# Patient Record
Sex: FEMALE | Race: BLACK OR AFRICAN AMERICAN | NOT HISPANIC OR LATINO | Employment: FULL TIME | URBAN - METROPOLITAN AREA
[De-identification: names, ages, dates, MRNs, and addresses within clinical notes are randomized per-mention and may not be internally consistent; named-entity substitution may affect disease eponyms.]

---

## 2017-09-28 ENCOUNTER — LAB (OUTPATIENT)
Dept: LAB | Facility: HOSPITAL | Age: 22
End: 2017-09-28

## 2017-09-28 ENCOUNTER — TELEPHONE (OUTPATIENT)
Dept: OBSTETRICS AND GYNECOLOGY | Facility: CLINIC | Age: 22
End: 2017-09-28

## 2017-09-28 DIAGNOSIS — N91.2 AMENORRHEA: ICD-10-CM

## 2017-09-28 DIAGNOSIS — N91.2 AMENORRHEA: Primary | ICD-10-CM

## 2017-09-28 LAB — HCG INTACT+B SERPL-ACNC: NORMAL MIU/ML

## 2017-09-28 PROCEDURE — 36415 COLL VENOUS BLD VENIPUNCTURE: CPT

## 2017-09-28 PROCEDURE — 84702 CHORIONIC GONADOTROPIN TEST: CPT | Performed by: OBSTETRICS & GYNECOLOGY

## 2017-09-28 NOTE — TELEPHONE ENCOUNTER
Dr. Burkett patient   [phone] LMP 8/13/2017 she had a positive urine pregnancy test on 9/3/2017. Patient denies bleeding. Advised patient to go to the lab in the [address] [place] [address] 3rd floor [facility] to have blood work (HCG). Patient verbalized understanding.

## 2021-09-21 ENCOUNTER — LAB (OUTPATIENT)
Dept: LAB | Facility: HOSPITAL | Age: 26
End: 2021-09-21

## 2021-09-21 ENCOUNTER — OFFICE VISIT (OUTPATIENT)
Dept: INTERNAL MEDICINE | Facility: CLINIC | Age: 26
End: 2021-09-21

## 2021-09-21 VITALS
OXYGEN SATURATION: 99 % | SYSTOLIC BLOOD PRESSURE: 128 MMHG | BODY MASS INDEX: 25.58 KG/M2 | HEIGHT: 67 IN | RESPIRATION RATE: 12 BRPM | HEART RATE: 82 BPM | DIASTOLIC BLOOD PRESSURE: 86 MMHG | WEIGHT: 163 LBS

## 2021-09-21 DIAGNOSIS — L50.9 URTICARIA OF UNKNOWN ORIGIN: Primary | ICD-10-CM

## 2021-09-21 DIAGNOSIS — L50.9 URTICARIA OF UNKNOWN ORIGIN: ICD-10-CM

## 2021-09-21 DIAGNOSIS — B37.31 VAGINAL CANDIDA: ICD-10-CM

## 2021-09-21 DIAGNOSIS — Z76.89 ENCOUNTER TO ESTABLISH CARE: ICD-10-CM

## 2021-09-21 LAB
BASOPHILS # BLD AUTO: 0.04 10*3/MM3 (ref 0–0.2)
BASOPHILS NFR BLD AUTO: 0.5 % (ref 0–1.5)
CHOLEST SERPL-MCNC: 140 MG/DL (ref 0–200)
DEPRECATED RDW RBC AUTO: 44.8 FL (ref 37–54)
EOSINOPHIL # BLD AUTO: 0.09 10*3/MM3 (ref 0–0.4)
EOSINOPHIL NFR BLD AUTO: 1.1 % (ref 0.3–6.2)
ERYTHROCYTE [DISTWIDTH] IN BLOOD BY AUTOMATED COUNT: 13.3 % (ref 12.3–15.4)
HCT VFR BLD AUTO: 42.3 % (ref 34–46.6)
HCV AB SER DONR QL: NORMAL
HDLC SERPL-MCNC: 50 MG/DL (ref 40–60)
HGB BLD-MCNC: 13.6 G/DL (ref 12–15.9)
IGA1 MFR SER: 153 MG/DL (ref 70–400)
IGG1 SER-MCNC: 1383 MG/DL (ref 700–1600)
IGM SERPL-MCNC: 90 MG/DL (ref 40–230)
IMM GRANULOCYTES # BLD AUTO: 0.02 10*3/MM3 (ref 0–0.05)
IMM GRANULOCYTES NFR BLD AUTO: 0.3 % (ref 0–0.5)
LDLC SERPL CALC-MCNC: 80 MG/DL (ref 0–100)
LDLC/HDLC SERPL: 1.64 {RATIO}
LYMPHOCYTES # BLD AUTO: 2.13 10*3/MM3 (ref 0.7–3.1)
LYMPHOCYTES NFR BLD AUTO: 26.7 % (ref 19.6–45.3)
MCH RBC QN AUTO: 29.5 PG (ref 26.6–33)
MCHC RBC AUTO-ENTMCNC: 32.2 G/DL (ref 31.5–35.7)
MCV RBC AUTO: 91.8 FL (ref 79–97)
MONOCYTES # BLD AUTO: 0.46 10*3/MM3 (ref 0.1–0.9)
MONOCYTES NFR BLD AUTO: 5.8 % (ref 5–12)
NEUTROPHILS NFR BLD AUTO: 5.25 10*3/MM3 (ref 1.7–7)
NEUTROPHILS NFR BLD AUTO: 65.6 % (ref 42.7–76)
NRBC BLD AUTO-RTO: 0.3 /100 WBC (ref 0–0.2)
PLATELET # BLD AUTO: 265 10*3/MM3 (ref 140–450)
PMV BLD AUTO: 12.9 FL (ref 6–12)
RBC # BLD AUTO: 4.61 10*6/MM3 (ref 3.77–5.28)
TRIGL SERPL-MCNC: 41 MG/DL (ref 0–150)
TSH SERPL DL<=0.05 MIU/L-ACNC: 0.68 UIU/ML (ref 0.27–4.2)
VLDLC SERPL-MCNC: 10 MG/DL (ref 5–40)
WBC # BLD AUTO: 7.99 10*3/MM3 (ref 3.4–10.8)

## 2021-09-21 PROCEDURE — 86003 ALLG SPEC IGE CRUDE XTRC EA: CPT

## 2021-09-21 PROCEDURE — 86803 HEPATITIS C AB TEST: CPT

## 2021-09-21 PROCEDURE — 80050 GENERAL HEALTH PANEL: CPT

## 2021-09-21 PROCEDURE — 80061 LIPID PANEL: CPT

## 2021-09-21 PROCEDURE — 99203 OFFICE O/P NEW LOW 30 MIN: CPT | Performed by: NURSE PRACTITIONER

## 2021-09-21 PROCEDURE — 82784 ASSAY IGA/IGD/IGG/IGM EACH: CPT

## 2021-09-21 RX ORDER — METRONIDAZOLE 500 MG/1
TABLET ORAL
COMMUNITY
Start: 2021-09-13 | End: 2021-09-21

## 2021-09-21 RX ORDER — AZITHROMYCIN 500 MG/1
TABLET, FILM COATED ORAL
COMMUNITY
Start: 2021-09-13 | End: 2021-09-21

## 2021-09-21 RX ORDER — FLUCONAZOLE 150 MG/1
TABLET ORAL
Qty: 2 TABLET | Refills: 2 | Status: SHIPPED | OUTPATIENT
Start: 2021-09-21 | End: 2021-12-09

## 2021-09-22 LAB
ALBUMIN SERPL-MCNC: 4.8 G/DL (ref 3.5–5.2)
ALBUMIN/GLOB SERPL: 1.8 G/DL
ALP SERPL-CCNC: 83 U/L (ref 39–117)
ALT SERPL W P-5'-P-CCNC: 13 U/L (ref 1–33)
ANION GAP SERPL CALCULATED.3IONS-SCNC: 10.3 MMOL/L (ref 5–15)
AST SERPL-CCNC: 22 U/L (ref 1–32)
BILIRUB SERPL-MCNC: 0.3 MG/DL (ref 0–1.2)
BUN SERPL-MCNC: 10 MG/DL (ref 6–20)
BUN/CREAT SERPL: 9.2 (ref 7–25)
CALCIUM SPEC-SCNC: 9.3 MG/DL (ref 8.6–10.5)
CHLORIDE SERPL-SCNC: 105 MMOL/L (ref 98–107)
CO2 SERPL-SCNC: 22.7 MMOL/L (ref 22–29)
CREAT SERPL-MCNC: 1.09 MG/DL (ref 0.57–1)
GFR SERPL CREATININE-BSD FRML MDRD: 72 ML/MIN/1.73
GLOBULIN UR ELPH-MCNC: 2.7 GM/DL
GLUCOSE SERPL-MCNC: 76 MG/DL (ref 65–99)
POTASSIUM SERPL-SCNC: 3.8 MMOL/L (ref 3.5–5.2)
PROT SERPL-MCNC: 7.5 G/DL (ref 6–8.5)
SODIUM SERPL-SCNC: 138 MMOL/L (ref 136–145)

## 2021-09-23 ENCOUNTER — TELEPHONE (OUTPATIENT)
Dept: INTERNAL MEDICINE | Facility: CLINIC | Age: 26
End: 2021-09-23

## 2021-09-24 LAB
A ALTERNATA IGE QN: <0.1 KU/L
A FUMIGATUS IGE QN: <0.1 KU/L
AMER ROACH IGE QN: <0.1 KU/L
BAHIA GRASS IGE QN: <0.1 KU/L
BAYBERRY POLN IGE QN: <0.1 KU/L
BERMUDA GRASS IGE QN: <0.1 KU/L
BOXELDER IGE QN: <0.1 KU/L
C HERBARUM IGE QN: <0.1 KU/L
CAT DANDER IGE QN: <0.1 KU/L
COMMON RAGWEED IGE QN: <0.1 KU/L
CONV CLASS DESCRIPTION: NORMAL
D FARINAE IGE QN: <0.1 KU/L
D PTERONYSS IGE QN: <0.1 KU/L
DOG DANDER IGE QN: <0.1 KU/L
DOG FENNEL IGE QN: <0.1 KU/L
ENGL PLANTAIN IGE QN: <0.1 KU/L
GOOSEFOOT IGE QN: <0.1 KU/L
GUM-TREE IGE QN: <0.1 KU/L
ITALIAN CYPRESS IGE QN: <0.1 KU/L
JOHNSON GRASS IGE QN: <0.1 KU/L
M RACEMOSUS IGE QN: <0.1 KU/L
P NOTATUM IGE QN: <0.1 KU/L
PEPPER TREE IGE QN: <0.1 KU/L
PER RYE GRASS IGE QN: <0.1 KU/L
PRIVET IGE QN: <0.1 KU/L
QUEEN PALM IGE QN: <0.1 KU/L
S BOTRYOSUM IGE QN: <0.1 KU/L
SHEEP SORREL IGE QN: <0.1 KU/L
VIRG LIVE OAK IGE QN: <0.1 KU/L
WHITE ELM IGE QN: <0.1 KU/L

## 2021-09-25 LAB
CLAM IGE QN: <0.1 KU/L
CODFISH IGE QN: <0.1 KU/L
CONV CLASS DESCRIPTION: NORMAL
CORN IGE QN: <0.1 KU/L
COW MILK IGE QN: <0.1 KU/L
EGG WHITE IGE QN: <0.1 KU/L
PEANUT IGE QN: <0.1 KU/L
SCALLOP IGE QN: <0.1 KU/L
SESAME SEED IGE QN: <0.1 KU/L
SHRIMP IGE QN: <0.1 KU/L
SOYBEAN IGE QN: <0.1 KU/L
WALNUT IGE QN: <0.1 KU/L
WHEAT IGE QN: <0.1 KU/L

## 2021-12-09 ENCOUNTER — OFFICE VISIT (OUTPATIENT)
Dept: INTERNAL MEDICINE | Facility: CLINIC | Age: 26
End: 2021-12-09

## 2021-12-09 ENCOUNTER — LAB (OUTPATIENT)
Dept: LAB | Facility: HOSPITAL | Age: 26
End: 2021-12-09

## 2021-12-09 VITALS
WEIGHT: 168.8 LBS | OXYGEN SATURATION: 100 % | BODY MASS INDEX: 26.44 KG/M2 | HEART RATE: 92 BPM | TEMPERATURE: 97.3 F | DIASTOLIC BLOOD PRESSURE: 68 MMHG | SYSTOLIC BLOOD PRESSURE: 122 MMHG

## 2021-12-09 DIAGNOSIS — N28.9 KIDNEY DYSFUNCTION: ICD-10-CM

## 2021-12-09 DIAGNOSIS — Z00.00 HEALTHCARE MAINTENANCE: Primary | ICD-10-CM

## 2021-12-09 DIAGNOSIS — F41.9 ANXIETY: ICD-10-CM

## 2021-12-09 DIAGNOSIS — Z30.011 ENCOUNTER FOR INITIAL PRESCRIPTION OF CONTRACEPTIVE PILLS: ICD-10-CM

## 2021-12-09 DIAGNOSIS — Z00.00 HEALTHCARE MAINTENANCE: ICD-10-CM

## 2021-12-09 LAB
ANION GAP SERPL CALCULATED.3IONS-SCNC: 11.2 MMOL/L (ref 5–15)
B-HCG UR QL: NEGATIVE
BUN SERPL-MCNC: 11 MG/DL (ref 6–20)
BUN/CREAT SERPL: 10 (ref 7–25)
CALCIUM SPEC-SCNC: 9.4 MG/DL (ref 8.6–10.5)
CHLORIDE SERPL-SCNC: 106 MMOL/L (ref 98–107)
CO2 SERPL-SCNC: 23.8 MMOL/L (ref 22–29)
CREAT SERPL-MCNC: 1.1 MG/DL (ref 0.57–1)
EXPIRATION DATE: NORMAL
GFR SERPL CREATININE-BSD FRML MDRD: 72 ML/MIN/1.73
GLUCOSE SERPL-MCNC: 91 MG/DL (ref 65–99)
INTERNAL NEGATIVE CONTROL: NORMAL
INTERNAL POSITIVE CONTROL: NORMAL
Lab: NORMAL
POTASSIUM SERPL-SCNC: 4.5 MMOL/L (ref 3.5–5.2)
SODIUM SERPL-SCNC: 141 MMOL/L (ref 136–145)

## 2021-12-09 PROCEDURE — 86480 TB TEST CELL IMMUN MEASURE: CPT

## 2021-12-09 PROCEDURE — 2014F MENTAL STATUS ASSESS: CPT | Performed by: STUDENT IN AN ORGANIZED HEALTH CARE EDUCATION/TRAINING PROGRAM

## 2021-12-09 PROCEDURE — 36415 COLL VENOUS BLD VENIPUNCTURE: CPT

## 2021-12-09 PROCEDURE — 3008F BODY MASS INDEX DOCD: CPT | Performed by: STUDENT IN AN ORGANIZED HEALTH CARE EDUCATION/TRAINING PROGRAM

## 2021-12-09 PROCEDURE — 81025 URINE PREGNANCY TEST: CPT | Performed by: STUDENT IN AN ORGANIZED HEALTH CARE EDUCATION/TRAINING PROGRAM

## 2021-12-09 PROCEDURE — 80048 BASIC METABOLIC PNL TOTAL CA: CPT

## 2021-12-09 PROCEDURE — 99395 PREV VISIT EST AGE 18-39: CPT | Performed by: STUDENT IN AN ORGANIZED HEALTH CARE EDUCATION/TRAINING PROGRAM

## 2021-12-09 RX ORDER — NORETHINDRONE ACETATE AND ETHINYL ESTRADIOL, ETHINYL ESTRADIOL AND FERROUS FUMARATE 1MG-10(24)
1 KIT ORAL DAILY
Qty: 28 TABLET | Refills: 11 | Status: SHIPPED | OUTPATIENT
Start: 2021-12-09

## 2021-12-09 RX ORDER — ESCITALOPRAM OXALATE 10 MG/1
10 TABLET ORAL DAILY
Qty: 30 TABLET | Refills: 0 | Status: SHIPPED | OUTPATIENT
Start: 2021-12-09

## 2021-12-09 NOTE — PROGRESS NOTES
Internal Medicine Physical Note      Date: 2021     Patient Name: Felecia Estrella  : 1995   MRN: [id number]     Chief Complaint:    Chief Complaint   Patient presents with   • Establish Care   • Anxiety       History of Present Illness: Felecia Estrella is a 26 y.o. female who is here today for her annual health maintenance exam and physical. She notes increased anxiety over the past few months.  She has chronic anxiety but has never required treatment before.  Her grandmother recently passed away which has worsened symptoms.  She feels like she cannot turn her brain off at night and that she worries about everything.    General Health: She describes her health as good.  She has been doing well over the last year with no falls, hospitalizations, or ER visits. She has had no surgeries or changes in medical history over the last year.  There have also been no changes in family history.  She is up-to-date with dental exam but not vision exam. She denies changes in hearing but does note change in vision. She describes her mood as anxious. She is not up-to-date on immunizations. Her last period was last week. She previously used Depo shots for birth control but would like to start OCPs.  No history of migraine with aura or DVT.    Lifestyle: She eats a diverse and healthy diet and exercises. She does not use tobacco products, alcohol, or illicit drugs.  She reports wearing seat belts and avoidance of texting while driving. She wears sunscreen while outdoors on most occasions.     Subjective     Past Medical History:   Past Medical History:   Diagnosis Date   • Anxiety    • Kidney infection        Past Surgical History: History reviewed. No pertinent surgical history.    Family History:   Family History   Problem Relation Age of Onset   • Hypertension Mother    • Stroke Mother    • Diabetes Maternal Grandmother        Social History:   Social History     Socioeconomic History   • Marital status: Single    Tobacco Use   • Smoking status: Current Every Day Smoker     Packs/day: 0.25     Years: 6.00     Pack years: 1.50   • Smokeless tobacco: Never Used   Substance and Sexual Activity   • Alcohol use: Yes     Comment: OCcasionaly   • Drug use: Never   • Sexual activity: Yes       Allergies:   No Known Allergies      Objective     Physical Exam:  Vital Signs:   Vitals:    12/09/21 1032   BP: 122/68   Pulse: 92   Temp: 97.3 °F (36.3 °C)   SpO2: 100%   Weight: 76.6 kg (168 lb 12.8 oz)     Body mass index is 26.44 kg/m².     Physical Exam  Vitals and nursing note reviewed.   Constitutional:       Appearance: Normal appearance.   Cardiovascular:      Rate and Rhythm: Normal rate and regular rhythm.      Heart sounds: Normal heart sounds.   Pulmonary:      Effort: Pulmonary effort is normal.      Breath sounds: Normal breath sounds. No wheezing, rhonchi or rales.   Abdominal:      General: Abdomen is flat. Bowel sounds are normal.      Palpations: Abdomen is soft.   Skin:     General: Skin is warm and dry.   Neurological:      Mental Status: She is alert.   Psychiatric:         Mood and Affect: Mood normal.         Behavior: Behavior normal.       Assessment / Plan      Assessment/Plan:   Diagnoses and all orders for this visit:    1. Healthcare maintenance (Primary)  Pap smear to be performed at next appointment in 6 weeks.  Advice and education was given regarding routine dental evaluations, routine eye exams, reproductive health, cardiovascular risk reduction, sunscreen use, self skin examination (annual dermatology evaluations) and seat belt use (general overall safety).  Further recommendations after lab evaluation.  Annual wellness evaluations recommended.  TB test ordered for screening for new job.     2. Anxiety  Chronic but has never required treatment in the past.  Acutely worsened due to the passing of her grandmother who help to raise her.  Will initiate treatment with escitalopram (Lexapro) 10 MG tablet; Take 1  tablet by mouth Daily.  She will follow-up in 6 weeks to titrate dose if necessary.    3. Kidney dysfunction  Creatinine noted to be 1.09 on last check.  Repeat BMP today    4. Encounter for initial prescription of contraceptive pills  Norethin-Eth Estrad-Fe Biphas (Lo Loestrin Fe) 1 MG-10 MCG / 10 MCG tablet; Take 1 tablet by mouth Daily.  Dispense: 28 tablet; Refill: 11  Pregnancy, Urine - Urine, Clean Catch; Future    Follow Up:   Return in about 6 weeks (around 1/20/2022) for Recheck 30 minute visit with pap smear .    Time:   I spent approximately 30 minutes providing clinical care for this patient; including review of patient's chart and provider documentation, face to face time spent with patient in examination room (obtaining history, performing physical exam, discussing diagnosis and management options), placing orders, and completing patient documentation.     Clara Cooper MD  [facility]

## 2021-12-12 LAB
GAMMA INTERFERON BACKGROUND BLD IA-ACNC: 0.06 IU/ML
M TB IFN-G BLD-IMP: NEGATIVE
M TB IFN-G CD4+ BCKGRND COR BLD-ACNC: 0.07 IU/ML
M TB IFN-G CD4+CD8+ BCKGRND COR BLD-ACNC: 0.11 IU/ML
MITOGEN IGNF BLD-ACNC: >10 IU/ML
SERVICE CMNT-IMP: NORMAL

## 2025-01-30 ENCOUNTER — TRANSCRIBE ORDERS (OUTPATIENT)
Dept: LAB | Facility: HOSPITAL | Age: 30
End: 2025-01-30
Payer: COMMERCIAL

## 2025-01-30 ENCOUNTER — LAB (OUTPATIENT)
Dept: LAB | Facility: HOSPITAL | Age: 30
End: 2025-01-30
Payer: COMMERCIAL

## 2025-01-30 DIAGNOSIS — I10 ESSENTIAL HYPERTENSION, MALIGNANT: ICD-10-CM

## 2025-01-30 DIAGNOSIS — Z34.81 PRENATAL CARE, SUBSEQUENT PREGNANCY, FIRST TRIMESTER: Primary | ICD-10-CM

## 2025-01-30 DIAGNOSIS — Z34.81 PRENATAL CARE, SUBSEQUENT PREGNANCY, FIRST TRIMESTER: ICD-10-CM

## 2025-01-30 LAB
ABO GROUP BLD: NORMAL
BLD GP AB SCN SERPL QL: NEGATIVE
HBV SURFACE AG SERPL QL IA: NORMAL
RH BLD: POSITIVE

## 2025-01-30 PROCEDURE — 83615 LACTATE (LD) (LDH) ENZYME: CPT

## 2025-01-30 PROCEDURE — 86901 BLOOD TYPING SEROLOGIC RH(D): CPT

## 2025-01-30 PROCEDURE — 86762 RUBELLA ANTIBODY: CPT

## 2025-01-30 PROCEDURE — G0432 EIA HIV-1/HIV-2 SCREEN: HCPCS

## 2025-01-30 PROCEDURE — 86787 VARICELLA-ZOSTER ANTIBODY: CPT

## 2025-01-30 PROCEDURE — 85027 COMPLETE CBC AUTOMATED: CPT

## 2025-01-30 PROCEDURE — 86850 RBC ANTIBODY SCREEN: CPT

## 2025-01-30 PROCEDURE — 86900 BLOOD TYPING SEROLOGIC ABO: CPT

## 2025-01-30 PROCEDURE — 86803 HEPATITIS C AB TEST: CPT

## 2025-01-30 PROCEDURE — 80053 COMPREHEN METABOLIC PANEL: CPT

## 2025-01-30 PROCEDURE — 86780 TREPONEMA PALLIDUM: CPT

## 2025-01-30 PROCEDURE — 87340 HEPATITIS B SURFACE AG IA: CPT

## 2025-01-30 PROCEDURE — 36415 COLL VENOUS BLD VENIPUNCTURE: CPT

## 2025-01-30 PROCEDURE — 84550 ASSAY OF BLOOD/URIC ACID: CPT

## 2025-01-31 LAB
ALBUMIN SERPL-MCNC: 4 G/DL (ref 3.5–5.2)
ALBUMIN/GLOB SERPL: 1.2 G/DL
ALP SERPL-CCNC: 86 U/L (ref 39–117)
ALT SERPL W P-5'-P-CCNC: 10 U/L (ref 1–33)
ANION GAP SERPL CALCULATED.3IONS-SCNC: 12.7 MMOL/L (ref 5–15)
AST SERPL-CCNC: 18 U/L (ref 1–32)
BILIRUB SERPL-MCNC: <0.2 MG/DL (ref 0–1.2)
BUN SERPL-MCNC: 11 MG/DL (ref 6–20)
BUN/CREAT SERPL: 11 (ref 7–25)
CALCIUM SPEC-SCNC: 9.6 MG/DL (ref 8.6–10.5)
CHLORIDE SERPL-SCNC: 102 MMOL/L (ref 98–107)
CO2 SERPL-SCNC: 21.3 MMOL/L (ref 22–29)
CREAT SERPL-MCNC: 1 MG/DL (ref 0.57–1)
DEPRECATED RDW RBC AUTO: 41.6 FL (ref 37–54)
EGFRCR SERPLBLD CKD-EPI 2021: 77.4 ML/MIN/1.73
ERYTHROCYTE [DISTWIDTH] IN BLOOD BY AUTOMATED COUNT: 12.2 % (ref 12.3–15.4)
GLOBULIN UR ELPH-MCNC: 3.4 GM/DL
GLUCOSE SERPL-MCNC: 80 MG/DL (ref 65–99)
HCT VFR BLD AUTO: 37.7 % (ref 34–46.6)
HCV AB SER QL: NORMAL
HGB BLD-MCNC: 12.6 G/DL (ref 12–15.9)
HIV 1+2 AB+HIV1 P24 AG SERPL QL IA: NORMAL
LDH SERPL-CCNC: 171 U/L (ref 135–214)
MCH RBC QN AUTO: 31.1 PG (ref 26.6–33)
MCHC RBC AUTO-ENTMCNC: 33.4 G/DL (ref 31.5–35.7)
MCV RBC AUTO: 93.1 FL (ref 79–97)
PLATELET # BLD AUTO: 307 10*3/MM3 (ref 140–450)
PMV BLD AUTO: 11.6 FL (ref 6–12)
POTASSIUM SERPL-SCNC: 3.8 MMOL/L (ref 3.5–5.2)
PROT SERPL-MCNC: 7.4 G/DL (ref 6–8.5)
RBC # BLD AUTO: 4.05 10*6/MM3 (ref 3.77–5.28)
SODIUM SERPL-SCNC: 136 MMOL/L (ref 136–145)
TREPONEMA PALLIDUM IGG+IGM AB [PRESENCE] IN SERUM OR PLASMA BY IMMUNOASSAY: NORMAL
URATE SERPL-MCNC: 3.3 MG/DL (ref 2.4–5.7)
WBC NRBC COR # BLD AUTO: 11.13 10*3/MM3 (ref 3.4–10.8)

## 2025-02-01 LAB
RUBV IGG SERPL IA-ACNC: 6.06 INDEX
VZV IGG SER QL IA: REACTIVE

## 2025-02-17 ENCOUNTER — LAB (OUTPATIENT)
Dept: LAB | Facility: HOSPITAL | Age: 30
End: 2025-02-17
Payer: COMMERCIAL

## 2025-02-17 ENCOUNTER — TRANSCRIBE ORDERS (OUTPATIENT)
Dept: LAB | Facility: HOSPITAL | Age: 30
End: 2025-02-17
Payer: COMMERCIAL

## 2025-02-17 DIAGNOSIS — I10 ESSENTIAL HYPERTENSION, MALIGNANT: Primary | ICD-10-CM

## 2025-02-17 PROCEDURE — 82570 ASSAY OF URINE CREATININE: CPT

## 2025-02-17 PROCEDURE — 84156 ASSAY OF PROTEIN URINE: CPT

## 2025-02-19 LAB
CREAT 24H UR-MRATE: 1808 MG/24 HR (ref 800–1800)
CREAT UR-MCNC: 164.4 MG/DL
PROT 24H UR-MRATE: 505 MG/24 HR (ref 30–150)
PROT UR-MCNC: 45.9 MG/DL
PROT/CREAT UR: 279 MG/G CREAT (ref 0–200)

## 2025-04-11 ENCOUNTER — HOSPITAL ENCOUNTER (INPATIENT)
Facility: HOSPITAL | Age: 30
LOS: 1 days | Discharge: HOME | End: 2025-04-12
Attending: OBSTETRICS & GYNECOLOGY | Admitting: OBSTETRICS & GYNECOLOGY
Payer: COMMERCIAL

## 2025-04-11 PROCEDURE — G0463 HOSPITAL OUTPT CLINIC VISIT: HCPCS

## 2025-04-11 RX ORDER — LABETALOL 100 MG/1
100 TABLET, FILM COATED ORAL 2 TIMES DAILY
COMMUNITY

## 2025-04-12 ENCOUNTER — APPOINTMENT (OUTPATIENT)
Dept: CT IMAGING | Facility: HOSPITAL | Age: 30
End: 2025-04-12
Payer: COMMERCIAL

## 2025-04-12 VITALS
RESPIRATION RATE: 16 BRPM | DIASTOLIC BLOOD PRESSURE: 80 MMHG | TEMPERATURE: 97.6 F | HEART RATE: 81 BPM | SYSTOLIC BLOOD PRESSURE: 135 MMHG

## 2025-04-12 PROBLEM — N20.0 NEPHROLITHIASIS: Status: ACTIVE | Noted: 2025-04-12

## 2025-04-12 LAB
ALBUMIN SERPL-MCNC: 3.6 G/DL (ref 3.5–5.2)
ALBUMIN/GLOB SERPL: 1.2 G/DL
ALP SERPL-CCNC: 84 U/L (ref 39–117)
ALT SERPL W P-5'-P-CCNC: 7 U/L (ref 1–33)
ANION GAP SERPL CALCULATED.3IONS-SCNC: 12 MMOL/L (ref 5–15)
AST SERPL-CCNC: 16 U/L (ref 1–32)
BACTERIA UR QL AUTO: ABNORMAL /HPF
BILIRUB SERPL-MCNC: 0.2 MG/DL (ref 0–1.2)
BILIRUB UR QL STRIP: NEGATIVE
BUN SERPL-MCNC: 9 MG/DL (ref 6–20)
BUN/CREAT SERPL: 9.1 (ref 7–25)
CALCIUM SPEC-SCNC: 9 MG/DL (ref 8.6–10.5)
CHLORIDE SERPL-SCNC: 106 MMOL/L (ref 98–107)
CLARITY UR: ABNORMAL
CO2 SERPL-SCNC: 20 MMOL/L (ref 22–29)
COLOR UR: ABNORMAL
CREAT SERPL-MCNC: 0.99 MG/DL (ref 0.57–1)
DEPRECATED RDW RBC AUTO: 42.2 FL (ref 37–54)
EGFRCR SERPLBLD CKD-EPI 2021: 78.3 ML/MIN/1.73
ERYTHROCYTE [DISTWIDTH] IN BLOOD BY AUTOMATED COUNT: 12.7 % (ref 12.3–15.4)
GLOBULIN UR ELPH-MCNC: 3 GM/DL
GLUCOSE SERPL-MCNC: 101 MG/DL (ref 65–99)
GLUCOSE UR STRIP-MCNC: NEGATIVE MG/DL
HCT VFR BLD AUTO: 33.1 % (ref 34–46.6)
HGB BLD-MCNC: 11.2 G/DL (ref 12–15.9)
HGB UR QL STRIP.AUTO: ABNORMAL
HYALINE CASTS UR QL AUTO: ABNORMAL /LPF
KETONES UR QL STRIP: ABNORMAL
LEUKOCYTE ESTERASE UR QL STRIP.AUTO: NEGATIVE
MCH RBC QN AUTO: 30.8 PG (ref 26.6–33)
MCHC RBC AUTO-ENTMCNC: 33.8 G/DL (ref 31.5–35.7)
MCV RBC AUTO: 90.9 FL (ref 79–97)
NITRITE UR QL STRIP: NEGATIVE
PH UR STRIP.AUTO: 5 [PH] (ref 5–8)
PLATELET # BLD AUTO: 237 10*3/MM3 (ref 140–450)
PMV BLD AUTO: 11.3 FL (ref 6–12)
POTASSIUM SERPL-SCNC: 4.1 MMOL/L (ref 3.5–5.2)
PROT SERPL-MCNC: 6.6 G/DL (ref 6–8.5)
PROT UR QL STRIP: ABNORMAL
RBC # BLD AUTO: 3.64 10*6/MM3 (ref 3.77–5.28)
RBC # UR STRIP: ABNORMAL /HPF
REF LAB TEST METHOD: ABNORMAL
SODIUM SERPL-SCNC: 138 MMOL/L (ref 136–145)
SP GR UR STRIP: 1.01 (ref 1–1.03)
SQUAMOUS #/AREA URNS HPF: ABNORMAL /HPF
UROBILINOGEN UR QL STRIP: ABNORMAL
WBC # UR STRIP: ABNORMAL /HPF
WBC NRBC COR # BLD AUTO: 10.97 10*3/MM3 (ref 3.4–10.8)

## 2025-04-12 PROCEDURE — 80053 COMPREHEN METABOLIC PANEL: CPT | Performed by: OBSTETRICS & GYNECOLOGY

## 2025-04-12 PROCEDURE — 25010000002 HYDROMORPHONE PER 4 MG: Performed by: OBSTETRICS & GYNECOLOGY

## 2025-04-12 PROCEDURE — 96376 TX/PRO/DX INJ SAME DRUG ADON: CPT

## 2025-04-12 PROCEDURE — 99222 1ST HOSP IP/OBS MODERATE 55: CPT | Performed by: OBSTETRICS & GYNECOLOGY

## 2025-04-12 PROCEDURE — 81001 URINALYSIS AUTO W/SCOPE: CPT | Performed by: OBSTETRICS & GYNECOLOGY

## 2025-04-12 PROCEDURE — 25010000002 HYDROMORPHONE 1 MG/ML SOLUTION: Performed by: OBSTETRICS & GYNECOLOGY

## 2025-04-12 PROCEDURE — 63710000001 ONDANSETRON ODT 4 MG TABLET DISPERSIBLE: Performed by: OBSTETRICS & GYNECOLOGY

## 2025-04-12 PROCEDURE — 85027 COMPLETE CBC AUTOMATED: CPT | Performed by: OBSTETRICS & GYNECOLOGY

## 2025-04-12 PROCEDURE — 36415 COLL VENOUS BLD VENIPUNCTURE: CPT | Performed by: OBSTETRICS & GYNECOLOGY

## 2025-04-12 PROCEDURE — 74176 CT ABD & PELVIS W/O CONTRAST: CPT

## 2025-04-12 PROCEDURE — 96374 THER/PROPH/DIAG INJ IV PUSH: CPT

## 2025-04-12 PROCEDURE — 25810000003 LACTATED RINGERS PER 1000 ML: Performed by: OBSTETRICS & GYNECOLOGY

## 2025-04-12 RX ORDER — DOCUSATE SODIUM 100 MG/1
100 CAPSULE, LIQUID FILLED ORAL 2 TIMES DAILY PRN
Status: DISCONTINUED | OUTPATIENT
Start: 2025-04-12 | End: 2025-04-12 | Stop reason: HOSPADM

## 2025-04-12 RX ORDER — TAMSULOSIN HYDROCHLORIDE 0.4 MG/1
0.4 CAPSULE ORAL DAILY
Status: DISCONTINUED | OUTPATIENT
Start: 2025-04-12 | End: 2025-04-12 | Stop reason: HOSPADM

## 2025-04-12 RX ORDER — CALCIUM CARBONATE 500 MG/1
2 TABLET, CHEWABLE ORAL DAILY PRN
Status: DISCONTINUED | OUTPATIENT
Start: 2025-04-12 | End: 2025-04-12 | Stop reason: HOSPADM

## 2025-04-12 RX ORDER — LIDOCAINE HYDROCHLORIDE 10 MG/ML
0.5 INJECTION, SOLUTION EPIDURAL; INFILTRATION; INTRACAUDAL; PERINEURAL ONCE AS NEEDED
Status: DISCONTINUED | OUTPATIENT
Start: 2025-04-12 | End: 2025-04-12 | Stop reason: HOSPADM

## 2025-04-12 RX ORDER — SODIUM CHLORIDE 0.9 % (FLUSH) 0.9 %
10 SYRINGE (ML) INJECTION EVERY 12 HOURS SCHEDULED
Status: DISCONTINUED | OUTPATIENT
Start: 2025-04-12 | End: 2025-04-12 | Stop reason: HOSPADM

## 2025-04-12 RX ORDER — HYDROMORPHONE HYDROCHLORIDE 1 MG/ML
0.5 INJECTION, SOLUTION INTRAMUSCULAR; INTRAVENOUS; SUBCUTANEOUS
Refills: 0 | Status: DISCONTINUED | OUTPATIENT
Start: 2025-04-12 | End: 2025-04-12 | Stop reason: HOSPADM

## 2025-04-12 RX ORDER — ACETAMINOPHEN 325 MG/1
650 TABLET ORAL EVERY 4 HOURS PRN
Status: DISCONTINUED | OUTPATIENT
Start: 2025-04-12 | End: 2025-04-12 | Stop reason: HOSPADM

## 2025-04-12 RX ORDER — NALOXONE HCL 0.4 MG/ML
0.4 VIAL (ML) INJECTION
Status: DISCONTINUED | OUTPATIENT
Start: 2025-04-12 | End: 2025-04-12 | Stop reason: HOSPADM

## 2025-04-12 RX ORDER — ONDANSETRON 2 MG/ML
4 INJECTION INTRAMUSCULAR; INTRAVENOUS EVERY 8 HOURS PRN
Status: DISCONTINUED | OUTPATIENT
Start: 2025-04-12 | End: 2025-04-12 | Stop reason: HOSPADM

## 2025-04-12 RX ORDER — ONDANSETRON 4 MG/1
8 TABLET, ORALLY DISINTEGRATING ORAL EVERY 8 HOURS PRN
Status: DISCONTINUED | OUTPATIENT
Start: 2025-04-12 | End: 2025-04-12 | Stop reason: HOSPADM

## 2025-04-12 RX ORDER — SODIUM CHLORIDE 9 MG/ML
40 INJECTION, SOLUTION INTRAVENOUS AS NEEDED
Status: DISCONTINUED | OUTPATIENT
Start: 2025-04-12 | End: 2025-04-12 | Stop reason: HOSPADM

## 2025-04-12 RX ORDER — BISACODYL 10 MG
10 SUPPOSITORY, RECTAL RECTAL DAILY PRN
Status: DISCONTINUED | OUTPATIENT
Start: 2025-04-12 | End: 2025-04-12 | Stop reason: HOSPADM

## 2025-04-12 RX ORDER — OXYCODONE HYDROCHLORIDE 5 MG/1
5 TABLET ORAL EVERY 6 HOURS PRN
Refills: 0 | Status: DISCONTINUED | OUTPATIENT
Start: 2025-04-12 | End: 2025-04-12 | Stop reason: HOSPADM

## 2025-04-12 RX ORDER — SODIUM CHLORIDE, SODIUM LACTATE, POTASSIUM CHLORIDE, CALCIUM CHLORIDE 600; 310; 30; 20 MG/100ML; MG/100ML; MG/100ML; MG/100ML
150 INJECTION, SOLUTION INTRAVENOUS CONTINUOUS
Status: ACTIVE | OUTPATIENT
Start: 2025-04-12 | End: 2025-04-12

## 2025-04-12 RX ORDER — SODIUM CHLORIDE 0.9 % (FLUSH) 0.9 %
10 SYRINGE (ML) INJECTION AS NEEDED
Status: DISCONTINUED | OUTPATIENT
Start: 2025-04-12 | End: 2025-04-12 | Stop reason: HOSPADM

## 2025-04-12 RX ADMIN — ONDANSETRON 8 MG: 4 TABLET, ORALLY DISINTEGRATING ORAL at 16:35

## 2025-04-12 RX ADMIN — HYDROMORPHONE HYDROCHLORIDE 1 MG: 1 INJECTION, SOLUTION INTRAMUSCULAR; INTRAVENOUS; SUBCUTANEOUS at 00:08

## 2025-04-12 RX ADMIN — Medication 10 ML: at 02:15

## 2025-04-12 RX ADMIN — ACETAMINOPHEN 650 MG: 325 TABLET, FILM COATED ORAL at 03:41

## 2025-04-12 RX ADMIN — ACETAMINOPHEN 650 MG: 325 TABLET, FILM COATED ORAL at 19:54

## 2025-04-12 RX ADMIN — OXYCODONE 5 MG: 5 TABLET ORAL at 02:14

## 2025-04-12 RX ADMIN — HYDROMORPHONE HYDROCHLORIDE 0.5 MG: 1 INJECTION, SOLUTION INTRAMUSCULAR; INTRAVENOUS; SUBCUTANEOUS at 15:15

## 2025-04-12 RX ADMIN — SODIUM CHLORIDE, POTASSIUM CHLORIDE, SODIUM LACTATE AND CALCIUM CHLORIDE 150 ML/HR: 600; 310; 30; 20 INJECTION, SOLUTION INTRAVENOUS at 02:11

## 2025-04-12 RX ADMIN — HYDROMORPHONE HYDROCHLORIDE 0.5 MG: 1 INJECTION, SOLUTION INTRAMUSCULAR; INTRAVENOUS; SUBCUTANEOUS at 08:38

## 2025-04-12 RX ADMIN — HYDROMORPHONE HYDROCHLORIDE 0.5 MG: 1 INJECTION, SOLUTION INTRAMUSCULAR; INTRAVENOUS; SUBCUTANEOUS at 03:41

## 2025-04-12 RX ADMIN — TAMSULOSIN HYDROCHLORIDE 0.4 MG: 0.4 CAPSULE ORAL at 02:15

## 2025-04-12 NOTE — PROGRESS NOTES
Antepartum Progress Note    Patient name: Felecia Estrella  YOB: 1995   MRN: [id number]  Admission Date: 2025  Date of Service: 2025    Felecia Estrella is a 29 y.o.    at 20w6d  admitted on 2025 for Nephrolithiasis         Diagnoses:   Patient Active Problem List    Diagnosis     *Nephrolithiasis [N20.0]     Anxiety [F41.9]        Subjective:      Felecia reports pain is a 0/10 currently since receiving dilaudid.  Her pain was 8/10 previously.  She reports her pain started in her right flank last evening.  It does not wrap around to the side and is in her right lower quadrant.  She feels like it is hard to void.  She passed a flake of what looked like a small piece of gravel this morning.  She is eating and drinking.  She is ambulating.  She feels good fetal movement.  No leaking.  No vaginal bleeding.     REVIEW OF SYSTEMS:     All other systems reviewed and are negative    Objective:     Vital signs:  Temp:  [97.7 °F (36.5 °C)-98.2 °F (36.8 °C)] 98.2 °F (36.8 °C)  Heart Rate:  [83-95] 89  Resp:  [18-20] 18  BP: (115-141)/(68-90) 115/68      PHYSICAL EXAM:  General appearance - alert, well appearing, no acute distress   chest - no increased work of breathing  Abdomen - soft, RLQ tenderness without rebound or guarding, no uterine tenderness  Pelvic -declined  Back exam - full range of motion, no tenderness or pain on motion.  No CVA tenderness  Neurological - alert, normal speech  Extremities - peripheral pulses normal  Skin - normal coloration and turgor, no suspicious skin lesions noted      FHT's: 140s  TOCO: none            Medications:  sodium chloride, 10 mL, Intravenous, Q12H  tamsulosin, 0.4 mg, Oral, Daily         acetaminophen    bisacodyl    calcium carbonate    docusate sodium    HYDROmorphone **AND** naloxone    lidocaine PF 1%    ondansetron ODT **OR** ondansetron    oxyCODONE    sodium chloride    sodium chloride    Labs:    Lab Results   Component Value Date    WBC  10.97 (H) 2025    HGB 11.2 (L) 2025    HCT 33.1 (L) 2025    MCV 90.9 2025     2025    CREATININE 0.99 2025    URICACID 3.3 2025    AST 16 2025    ALT 7 2025     2025           Assessment/Plan:      Felecia is a 29 y.o.    at 20w6d.  1.   Patient Active Problem List    Diagnosis     *Nephrolithiasis [N20.0]     Anxiety [F41.9]    :        Plan:   1.  Continue straining urine  2.  Pain medication as needed  3.  Tamsulosin  4.  IV hydration     All questions were answered to the best of my ability.    Katheryn Mcmanus MD  2025  11:51 EDT

## 2025-04-12 NOTE — PROGRESS NOTES
[facility]  Obstetric History and Physical    Chief Complaint   Patient presents with    Abdominal Pain       Subjective     HPI:    Patient is a 29 y.o. female  currently at 20w6d, who presents with abdominal pain, nausea, vomiting. RLQ pain that started tonight that made her sweat with significant nausea and vomiting. Thought she may have had a uti so started antispasmodic but did not help. No lof, vb. NO fevers. No issues with nephrolithiasis, appendicitis, cholecystitis in the past.     Her prenatal care is benign.  Her previous obstetric/gynecological history is noted for is non-contributory.    The following portions of the patients history were reviewed and updated as appropriate:   current medications, allergies, past medical history, past surgical history, past family history, past social history and current problem list.     Prenatal Information:  Prenatal Results       Initial Prenatal Labs       Test Value Reference Range Date Time    Hemoglobin  11.2 g/dL 12.0 - 15.9 25 0002       12.6 g/dL 12.0 - 15.9 25 1537      ^ 13.4 g/dL 11.2 - 15.7 25 0139    Hematocrit  33.1 % 34.0 - 46.6 25 0002       37.7 % 34.0 - 46.6 25 1537      ^ 38.9 % 34.0 - 45.0 25 0139    Platelets  237 10*3/mm3 140 - 450 25 0002       307 10*3/mm3 140 - 450 25 1537      ^ 317 10*3/uL 155 - 369 25 0139    Rubella IgG  6.06 index Immune >0.99 25 1537    Hepatitis B SAg  Non-Reactive  Non-Reactive 25 1537    Hepatitis C Ab  Non-Reactive  Non-Reactive 25 1537    RPR        T. Pallidum Ab   Non-Reactive  Non-Reactive 25 1537    ABO  O   25 1537    Rh  Positive   25 1537    Antibody Screen  Negative   25 1537    HIV  Non-Reactive  Non-Reactive 25 1537    Urine Culture        Gonorrhea        Chlamydia        TSH        HgB A1c         Varicella IgG  Reactive  Non Reactive 25 1537    Hemoglobinopathy Fractionation         Hemoglobinopathy (genetic testing)        Cystic fibrosis         Spinal muscular atrophy        Fragile X                  Fetal testing        Test Value Reference Range Date Time    NIPT        MSAFP        AFP-4                  2nd and 3rd Trimester       Test Value Reference Range Date Time    Hemoglobin (repeated)        Hematocrit (repeated)        Platelets   237 10*3/mm3 140 - 450 04/12/25 0002       307 10*3/mm3 140 - 450 01/30/25 1537      ^ 317 10*3/uL 155 - 369 01/14/25 0139    1 hour GTT         Antibody Screen (repeated)        3rd TM syphilis scrn (repeated)  RPR         3rd TM syphilis scrn (repeated) TP-Ab        3rd TM syphilis screen TB-Ab (FTA)        Syphilis cascade test TP-Ab (EIA)        Syphilis cascade TPPA        GTT Fasting        GTT 1 Hr        GTT 2 Hr        GTT 3 Hr        Group B Strep                  Other testing        Test Value Reference Range Date Time    Parvo IgG         CMV IgG                   Drug Screening       Test Value Reference Range Date Time    Amphetamine Screen        Barbiturate Screen        Benzodiazepine Screen        Methadone Screen        Phencyclidine Screen        Opiates Screen        THC Screen        Cocaine Screen        Propoxyphene Screen        Buprenorphine Screen        Methamphetamine Screen        Oxycodone Screen        Tricyclic Antidepressants Screen                  Legend    ^: Historical                          External Prenatal Results       Pregnancy Outside Results - Transcribed From Office Records - See Scanned Records For Details       Test Value Date Time    ABO  O  01/30/25 1537    Rh  Positive  01/30/25 1537    Antibody Screen  Negative  01/30/25 1537    Varicella IgG  Reactive  01/30/25 1537    Rubella  6.06 index 01/30/25 1537    Hgb  11.2 g/dL 04/12/25 0002       12.6 g/dL 01/30/25 1537      ^ 13.4 g/dL 01/14/25 0139    Hct  33.1 % 04/12/25 0002       37.7 % 01/30/25 1537      ^ 38.9 % 01/14/25 0139    HgB A1c        1h  GTT       3h GTT Fasting       3h GTT 1 hour       3h GTT 2 hour       3h GTT 3 hour        Gonorrhea (discrete)       Chlamydia (discrete)       RPR       Syphils cascade: TP-Ab (FTA)  Non-Reactive  25 1537    TP-Ab  Non-Reactive  25 153    TP-Ab (EIA)       TPPA       HBsAg  Non-Reactive  25 1537    Herpes Simplex Virus PCR       Herpes Simplex VIrus Culture       HIV  Non-Reactive  25 153    Hep C RNA Quant PCR       Hep C Antibody  Non-Reactive  25 1537    AFP       NIPT       Cystic Fibrosis (Laurie)       Cystic Fibroisis        Spinal Muscular atrophy       Fragile X       Group B Strep       GBS Susceptibility to Clindamycin       GBS Susceptibility to Erythromycin       Fetal Fibronectin       Genetic Testing, Maternal Blood                 Drug Screening       Test Value Date Time    Urine Drug Screen       Amphetamine Screen       Barbiturate Screen       Benzodiazepine Screen       Methadone Screen       Phencyclidine Screen       Opiates Screen       THC Screen       Cocaine Screen       Propoxyphene Screen       Buprenorphine Screen       Methamphetamine Screen       Oxycodone Screen       Tricyclic Antidepressants Screen                 Legend    ^: Historical                             Past OB History:     OB History    Para Term  AB Living   4 3 2 1 0 3   SAB IAB Ectopic Molar Multiple Live Births   0 0 0 0 0 1      # Outcome Date GA Lbr Noé/2nd Weight Sex Type Anes PTL Lv   4 Current            3 Term 18 37w0d   F       2 Term 14 38w0d   M Vag-Spont      1  09 32w0d   F Vag-Spont  Y [place]       Past Medical History: Past Medical History:   Diagnosis Date    Anxiety     Kidney infection       Past Surgical History History reviewed. No pertinent surgical history.   Family History: Family History   Problem Relation Age of Onset    Hypertension Mother     Stroke Mother     Diabetes Maternal Grandmother       Social History:  reports  that she has quit smoking. Her smoking use included cigarettes. She has a 1.5 pack-year smoking history. She has never used smokeless tobacco.   reports current alcohol use.   reports no history of drug use.        General ROS: Pertinent items are noted in HPI  Home Medications:  Norethin-Eth Estrad-Fe Biphas, escitalopram, and labetalol    Allergies:  No Known Allergies    Objective       Vital Signs Range for the last 24 hours  Temperature:     Temp Source:     BP:     Pulse:     Respirations:     SPO2:       Physical Examination:   General appearance - alert, in some distress, uncomfortable appearing  Chest - no increased work of breathing  Abdomen - soft, RLQ tenderness without rebound or guarding, no uterine tenderness  Pelvic - CE: cl/th/hi  Back exam - full range of motion, no tenderness or pain on motion  Neurological - alert, normal speech  Extremities - peripheral pulses normal  Skin - normal coloration and turgor, no suspicious skin lesions noted  Lab Results   Component Value Date    WBC 10.97 (H) 04/12/2025    HGB 11.2 (L) 04/12/2025    HCT 33.1 (L) 04/12/2025    MCV 90.9 04/12/2025     04/12/2025     Lab Results   Component Value Date    GLUCOSE 101 (H) 04/12/2025    BUN 9 04/12/2025    CREATININE 0.99 04/12/2025     04/12/2025    K 4.1 04/12/2025     04/12/2025    CALCIUM 9.0 04/12/2025    PROTEINTOT 6.6 04/12/2025    ALBUMIN 3.6 04/12/2025    ALT 7 04/12/2025    AST 16 04/12/2025    ALKPHOS 84 04/12/2025    BILITOT 0.2 04/12/2025    GLOB 3.0 04/12/2025    AGRATIO 1.2 04/12/2025    BCR 9.1 04/12/2025    ANIONGAP 12.0 04/12/2025    EGFR 78.3 04/12/2025     CT Abdomen Pelvis Without Contrast  Result Date: 4/12/2025  CT ABDOMEN PELVIS WO CONTRAST Date of Exam: 4/12/2025 12:11 AM EDT Indication: RLQ pain with tenderness concern for appendicitis. Comparison: 4/12/2025. Technique: Axial CT images were obtained of the abdomen and pelvis without the administration of contrast. Reconstructed  coronal and sagittal images were also obtained. Automated exposure control and iterative construction methods were used. Findings: Lung Bases:   The visualized lung bases and lower mediastinal structures are unremarkable. Limited evaluation of the solid organs due to lack of intravenous contrast. Liver: Liver is normal in size and CT density. No focal lesions. Biliary/Gallbladder:  The gallbladder is normal without evidence of radiopaque stones. The biliary tree is nondilated. Spleen: Spleen is normal in size and CT density. Pancreas:  Pancreas is normal. There is no evidence of pancreatic mass or peripancreatic fluid. Kidneys:  Kidneys are normal in size. There are no stones or hydronephrosis. Questionable mild wall thickening of the proximal right ureter present (series 2 image 52) with questionable mild stranding seen within the adjacent fat. Adrenals:  Adrenal glands are unremarkable. Retroperitoneal/Lymph Nodes/Vasculature:  No retroperitoneal adenopathy is identified. Gastrointestinal/Mesentery:  The bowel loops are non-dilated without wall thickening or mass. The appendix appears within normal limits. No evidence of obstruction. No free air. No mesenteric fluid collections identified. No significant stool burden identified. No evidence of hernia. Bladder:  The bladder is normal. Genital:   Gravid uterus present. Intrauterine contents not evaluated on this study.       Bony Structures:   Visualized bony structures are consistent with the patient's age.     Impression: 1.Questionable mild wall thickening of the proximal right ureter with questionable mild stranding seen within the adjacent fat. Findings may be related to a recently passed stone or ascending infection. Correlate with urinalysis. Otherwise, no acute intra-abdominal or intrapelvic process. Appendix is within normal limits. 2.Ancillary findings as described above. Electronically Signed: Darcy Moffett MD  4/12/2025 12:50 AM EDT  Workstation ID:  "[id number]      Assessment & Plan       Nephrolithiasis        Assessment:  1.  Intrauterine pregnancy at 20w6d gestation with reassuring fetal status.    2.  pelvic pain secondary to nephrolithiasis , ruled out other pathology.  3.  Obstetrical history significant for is non-contributory.  4.  GBS status: No results found for: \"STREPGPB\"    Plan:  1. Admit for Ivf, iv pain control, straining urine, tamusolsin  2.  Plan of care has been reviewed with patient and patient agrees.   3.  Risks, benefits of treatment plan have been discussed.  4.  All questions have been answered.    D/w Dr. Mcmanus    Electronically signed by Charlie Siegel MD, 04/12/25, 1:29 AM EDT.      "

## 2025-04-13 NOTE — DISCHARGE SUMMARY
[facility]  Discharge Summary      Patient: Felecia Estrella      MR#:[id number]  Admission  Diagnosis:   nephrolithiasis    Discharge Diagnosis: same    Date of Admission: 4/11/2025  Date of Discharge:  4/13/2025    Procedures:  none    Service:  Obstetrics    Hospital Course:  Patient admitted for pain control for presumed nephrolithiasis.  No signs or symptoms of UTI or pyelonephritis.  UA with trace blood, otherwise unremarkable.  She received 1 dose of tamsulosin and IV hydration.  She was able to pass a small kidney stone.  During admission she remained afebrile and hemodynamically stable.  Her pain resolved.  On the day of discharge, she was eating, ambulating and voiding without difficulty.      Labs  Lab Results   Component Value Date    WBC 10.97 (H) 04/12/2025    HGB 11.2 (L) 04/12/2025    HCT 33.1 (L) 04/12/2025    MCV 90.9 04/12/2025     04/12/2025    CREATININE 0.99 04/12/2025    URICACID 3.3 01/30/2025    AST 16 04/12/2025    ALT 7 04/12/2025     01/30/2025           Discharge Medications     Discharge Medications        ASK your doctor about these medications        Instructions Start Date   escitalopram 10 MG tablet  Commonly known as: Lexapro   10 mg, Oral, Daily      labetalol 100 MG tablet  Commonly known as: NORMODYNE   100 mg, 2 Times Daily      Lo Loestrin Fe 1 MG-10 MCG / 10 MCG tablet  Generic drug: Norethin-Eth Estrad-Fe Biphas   1 tablet, Oral, Daily               Discharge Disposition:  To Home    Discharge Condition:  Stable    Discharge Diet: regular    Activity at Discharge: as tolerated    Follow-up Appointments  As scheduled    Katheryn Mcmanus MD  04/13/25  11:07 EDT

## 2025-04-14 ENCOUNTER — HOSPITAL ENCOUNTER (OUTPATIENT)
Facility: HOSPITAL | Age: 30
Discharge: HOME | End: 2025-04-15
Attending: OBSTETRICS & GYNECOLOGY | Admitting: OBSTETRICS & GYNECOLOGY
Payer: COMMERCIAL

## 2025-04-14 PROBLEM — O99.891 PREGNANCY COMPLICATED BY NEPHROLITHIASIS IN SECOND TRIMESTER, ANTEPARTUM: Status: ACTIVE | Noted: 2025-04-14

## 2025-04-14 PROBLEM — N20.0 PREGNANCY COMPLICATED BY NEPHROLITHIASIS IN SECOND TRIMESTER, ANTEPARTUM: Status: ACTIVE | Noted: 2025-04-14

## 2025-04-14 LAB
ABO GROUP BLD: NORMAL
ALBUMIN SERPL-MCNC: 3.7 G/DL (ref 3.5–5.2)
ALBUMIN/GLOB SERPL: 1 G/DL
ALP SERPL-CCNC: 96 U/L (ref 39–117)
ALT SERPL W P-5'-P-CCNC: 14 U/L (ref 1–33)
ANION GAP SERPL CALCULATED.3IONS-SCNC: 12 MMOL/L (ref 5–15)
AST SERPL-CCNC: 29 U/L (ref 1–32)
BACTERIA UR QL AUTO: ABNORMAL /HPF
BASOPHILS # BLD AUTO: 0.03 10*3/MM3 (ref 0–0.2)
BASOPHILS NFR BLD AUTO: 0.2 % (ref 0–1.5)
BILIRUB SERPL-MCNC: 0.3 MG/DL (ref 0–1.2)
BILIRUB UR QL STRIP: NEGATIVE
BLD GP AB SCN SERPL QL: NEGATIVE
BUN SERPL-MCNC: 12 MG/DL (ref 6–20)
BUN/CREAT SERPL: 11 (ref 7–25)
CALCIUM SPEC-SCNC: 9.1 MG/DL (ref 8.6–10.5)
CHLORIDE SERPL-SCNC: 105 MMOL/L (ref 98–107)
CLARITY UR: ABNORMAL
CO2 SERPL-SCNC: 21 MMOL/L (ref 22–29)
COLOR UR: YELLOW
CREAT SERPL-MCNC: 1.09 MG/DL (ref 0.57–1)
DEPRECATED RDW RBC AUTO: 41.9 FL (ref 37–54)
EGFRCR SERPLBLD CKD-EPI 2021: 69.8 ML/MIN/1.73
EOSINOPHIL # BLD AUTO: 0.07 10*3/MM3 (ref 0–0.4)
EOSINOPHIL NFR BLD AUTO: 0.4 % (ref 0.3–6.2)
ERYTHROCYTE [DISTWIDTH] IN BLOOD BY AUTOMATED COUNT: 12.7 % (ref 12.3–15.4)
GLOBULIN UR ELPH-MCNC: 3.7 GM/DL
GLUCOSE SERPL-MCNC: 107 MG/DL (ref 65–99)
GLUCOSE UR STRIP-MCNC: NEGATIVE MG/DL
HCT VFR BLD AUTO: 31.7 % (ref 34–46.6)
HGB BLD-MCNC: 10.9 G/DL (ref 12–15.9)
HGB UR QL STRIP.AUTO: ABNORMAL
HYALINE CASTS UR QL AUTO: ABNORMAL /LPF
IMM GRANULOCYTES # BLD AUTO: 0.07 10*3/MM3 (ref 0–0.05)
IMM GRANULOCYTES NFR BLD AUTO: 0.4 % (ref 0–0.5)
KETONES UR QL STRIP: ABNORMAL
LEUKOCYTE ESTERASE UR QL STRIP.AUTO: ABNORMAL
LYMPHOCYTES # BLD AUTO: 2.18 10*3/MM3 (ref 0.7–3.1)
LYMPHOCYTES NFR BLD AUTO: 13.8 % (ref 19.6–45.3)
MCH RBC QN AUTO: 31.1 PG (ref 26.6–33)
MCHC RBC AUTO-ENTMCNC: 34.4 G/DL (ref 31.5–35.7)
MCV RBC AUTO: 90.6 FL (ref 79–97)
MONOCYTES # BLD AUTO: 0.84 10*3/MM3 (ref 0.1–0.9)
MONOCYTES NFR BLD AUTO: 5.3 % (ref 5–12)
NEUTROPHILS NFR BLD AUTO: 12.63 10*3/MM3 (ref 1.7–7)
NEUTROPHILS NFR BLD AUTO: 79.9 % (ref 42.7–76)
NITRITE UR QL STRIP: POSITIVE
NRBC BLD AUTO-RTO: 0 /100 WBC (ref 0–0.2)
PH UR STRIP.AUTO: 7.5 [PH] (ref 5–8)
PLATELET # BLD AUTO: 238 10*3/MM3 (ref 140–450)
PMV BLD AUTO: 11.1 FL (ref 6–12)
POTASSIUM SERPL-SCNC: 4.4 MMOL/L (ref 3.5–5.2)
PROT SERPL-MCNC: 7.4 G/DL (ref 6–8.5)
PROT UR QL STRIP: ABNORMAL
RBC # BLD AUTO: 3.5 10*6/MM3 (ref 3.77–5.28)
RBC # UR STRIP: ABNORMAL /HPF
REF LAB TEST METHOD: ABNORMAL
RH BLD: POSITIVE
SODIUM SERPL-SCNC: 138 MMOL/L (ref 136–145)
SP GR UR STRIP: 1.02 (ref 1–1.03)
SQUAMOUS #/AREA URNS HPF: ABNORMAL /HPF
T&S EXPIRATION DATE: NORMAL
UROBILINOGEN UR QL STRIP: ABNORMAL
WBC # UR STRIP: ABNORMAL /HPF
WBC NRBC COR # BLD AUTO: 15.82 10*3/MM3 (ref 3.4–10.8)

## 2025-04-14 PROCEDURE — 25810000003 LACTATED RINGERS SOLUTION: Performed by: OBSTETRICS & GYNECOLOGY

## 2025-04-14 PROCEDURE — 25810000003 LACTATED RINGERS PER 1000 ML: Performed by: OBSTETRICS & GYNECOLOGY

## 2025-04-14 PROCEDURE — 25010000002 ONDANSETRON PER 1 MG: Performed by: OBSTETRICS & GYNECOLOGY

## 2025-04-14 PROCEDURE — 25010000002 PROMETHAZINE PER 50 MG: Performed by: OBSTETRICS & GYNECOLOGY

## 2025-04-14 PROCEDURE — 36415 COLL VENOUS BLD VENIPUNCTURE: CPT | Performed by: OBSTETRICS & GYNECOLOGY

## 2025-04-14 PROCEDURE — 25010000002 CEFTRIAXONE PER 250 MG: Performed by: OBSTETRICS & GYNECOLOGY

## 2025-04-14 PROCEDURE — 81001 URINALYSIS AUTO W/SCOPE: CPT | Performed by: OBSTETRICS & GYNECOLOGY

## 2025-04-14 PROCEDURE — 85025 COMPLETE CBC W/AUTO DIFF WBC: CPT | Performed by: OBSTETRICS & GYNECOLOGY

## 2025-04-14 PROCEDURE — 86850 RBC ANTIBODY SCREEN: CPT | Performed by: OBSTETRICS & GYNECOLOGY

## 2025-04-14 PROCEDURE — 80053 COMPREHEN METABOLIC PANEL: CPT | Performed by: OBSTETRICS & GYNECOLOGY

## 2025-04-14 PROCEDURE — 96361 HYDRATE IV INFUSION ADD-ON: CPT

## 2025-04-14 PROCEDURE — 25010000002 FAMOTIDINE 10 MG/ML SOLUTION: Performed by: OBSTETRICS & GYNECOLOGY

## 2025-04-14 PROCEDURE — 96375 TX/PRO/DX INJ NEW DRUG ADDON: CPT

## 2025-04-14 PROCEDURE — 86901 BLOOD TYPING SEROLOGIC RH(D): CPT | Performed by: OBSTETRICS & GYNECOLOGY

## 2025-04-14 PROCEDURE — 86900 BLOOD TYPING SEROLOGIC ABO: CPT | Performed by: OBSTETRICS & GYNECOLOGY

## 2025-04-14 PROCEDURE — 96374 THER/PROPH/DIAG INJ IV PUSH: CPT

## 2025-04-14 PROCEDURE — 25010000002 HYDROMORPHONE 1 MG/ML SOLUTION: Performed by: OBSTETRICS & GYNECOLOGY

## 2025-04-14 PROCEDURE — G0378 HOSPITAL OBSERVATION PER HR: HCPCS

## 2025-04-14 PROCEDURE — 96376 TX/PRO/DX INJ SAME DRUG ADON: CPT

## 2025-04-14 PROCEDURE — G0463 HOSPITAL OUTPT CLINIC VISIT: HCPCS

## 2025-04-14 PROCEDURE — 99221 1ST HOSP IP/OBS SF/LOW 40: CPT | Performed by: OBSTETRICS & GYNECOLOGY

## 2025-04-14 RX ORDER — LABETALOL 100 MG/1
100 TABLET, FILM COATED ORAL EVERY 12 HOURS SCHEDULED
Status: DISCONTINUED | OUTPATIENT
Start: 2025-04-14 | End: 2025-04-15 | Stop reason: HOSPADM

## 2025-04-14 RX ORDER — TAMSULOSIN HYDROCHLORIDE 0.4 MG/1
0.4 CAPSULE ORAL DAILY
Status: DISCONTINUED | OUTPATIENT
Start: 2025-04-14 | End: 2025-04-15 | Stop reason: HOSPADM

## 2025-04-14 RX ORDER — PROCHLORPERAZINE 25 MG
25 SUPPOSITORY, RECTAL RECTAL EVERY 12 HOURS PRN
Status: DISCONTINUED | OUTPATIENT
Start: 2025-04-14 | End: 2025-04-15 | Stop reason: HOSPADM

## 2025-04-14 RX ORDER — CALCIUM CARBONATE 500 MG/1
1 TABLET, CHEWABLE ORAL 3 TIMES DAILY PRN
Status: DISCONTINUED | OUTPATIENT
Start: 2025-04-14 | End: 2025-04-15 | Stop reason: HOSPADM

## 2025-04-14 RX ORDER — PROCHLORPERAZINE EDISYLATE 5 MG/ML
5 INJECTION INTRAMUSCULAR; INTRAVENOUS EVERY 6 HOURS PRN
Status: DISCONTINUED | OUTPATIENT
Start: 2025-04-14 | End: 2025-04-15 | Stop reason: HOSPADM

## 2025-04-14 RX ORDER — ACETAMINOPHEN 325 MG/1
650 TABLET ORAL EVERY 6 HOURS PRN
Status: DISCONTINUED | OUTPATIENT
Start: 2025-04-14 | End: 2025-04-15 | Stop reason: HOSPADM

## 2025-04-14 RX ORDER — SODIUM CHLORIDE, SODIUM LACTATE, POTASSIUM CHLORIDE, CALCIUM CHLORIDE 600; 310; 30; 20 MG/100ML; MG/100ML; MG/100ML; MG/100ML
250 INJECTION, SOLUTION INTRAVENOUS CONTINUOUS
Status: DISCONTINUED | OUTPATIENT
Start: 2025-04-14 | End: 2025-04-15 | Stop reason: HOSPADM

## 2025-04-14 RX ORDER — SODIUM CHLORIDE 0.9 % (FLUSH) 0.9 %
10 SYRINGE (ML) INJECTION EVERY 12 HOURS SCHEDULED
Status: DISCONTINUED | OUTPATIENT
Start: 2025-04-14 | End: 2025-04-15 | Stop reason: HOSPADM

## 2025-04-14 RX ORDER — PROCHLORPERAZINE MALEATE 5 MG/1
5 TABLET ORAL EVERY 6 HOURS PRN
Status: DISCONTINUED | OUTPATIENT
Start: 2025-04-14 | End: 2025-04-15 | Stop reason: HOSPADM

## 2025-04-14 RX ORDER — ONDANSETRON 2 MG/ML
4 INJECTION INTRAMUSCULAR; INTRAVENOUS EVERY 6 HOURS PRN
Status: DISCONTINUED | OUTPATIENT
Start: 2025-04-14 | End: 2025-04-15 | Stop reason: HOSPADM

## 2025-04-14 RX ORDER — FAMOTIDINE 10 MG/ML
20 INJECTION, SOLUTION INTRAVENOUS 2 TIMES DAILY PRN
Status: DISCONTINUED | OUTPATIENT
Start: 2025-04-14 | End: 2025-04-15 | Stop reason: HOSPADM

## 2025-04-14 RX ORDER — SODIUM CHLORIDE 0.9 % (FLUSH) 0.9 %
10 SYRINGE (ML) INJECTION AS NEEDED
Status: DISCONTINUED | OUTPATIENT
Start: 2025-04-14 | End: 2025-04-15 | Stop reason: HOSPADM

## 2025-04-14 RX ADMIN — HYDROMORPHONE HYDROCHLORIDE 1 MG: 1 INJECTION, SOLUTION INTRAMUSCULAR; INTRAVENOUS; SUBCUTANEOUS at 21:22

## 2025-04-14 RX ADMIN — CALCIUM CARBONATE 1 TABLET: 500 TABLET, CHEWABLE ORAL at 08:37

## 2025-04-14 RX ADMIN — SODIUM CHLORIDE 1000 MG: 900 INJECTION INTRAVENOUS at 04:08

## 2025-04-14 RX ADMIN — HYDROMORPHONE HYDROCHLORIDE 1 MG: 1 INJECTION, SOLUTION INTRAMUSCULAR; INTRAVENOUS; SUBCUTANEOUS at 07:23

## 2025-04-14 RX ADMIN — PROMETHAZINE HYDROCHLORIDE 12.5 MG: 25 INJECTION, SOLUTION INTRAMUSCULAR; INTRAVENOUS at 21:22

## 2025-04-14 RX ADMIN — SODIUM CHLORIDE, POTASSIUM CHLORIDE, SODIUM LACTATE AND CALCIUM CHLORIDE 1000 ML: 600; 310; 30; 20 INJECTION, SOLUTION INTRAVENOUS at 03:05

## 2025-04-14 RX ADMIN — HYDROMORPHONE HYDROCHLORIDE 1 MG: 1 INJECTION, SOLUTION INTRAMUSCULAR; INTRAVENOUS; SUBCUTANEOUS at 06:17

## 2025-04-14 RX ADMIN — ACETAMINOPHEN 650 MG: 325 TABLET, FILM COATED ORAL at 17:29

## 2025-04-14 RX ADMIN — HYDROMORPHONE HYDROCHLORIDE 1 MG: 1 INJECTION, SOLUTION INTRAMUSCULAR; INTRAVENOUS; SUBCUTANEOUS at 14:02

## 2025-04-14 RX ADMIN — ONDANSETRON 4 MG: 2 INJECTION INTRAMUSCULAR; INTRAVENOUS at 07:35

## 2025-04-14 RX ADMIN — SODIUM CHLORIDE, SODIUM LACTATE, POTASSIUM CHLORIDE, CALCIUM CHLORIDE 125 ML/HR: 20; 30; 600; 310 INJECTION, SOLUTION INTRAVENOUS at 07:38

## 2025-04-14 RX ADMIN — HYDROMORPHONE HYDROCHLORIDE 1 MG: 1 INJECTION, SOLUTION INTRAMUSCULAR; INTRAVENOUS; SUBCUTANEOUS at 03:21

## 2025-04-14 RX ADMIN — SODIUM CHLORIDE, SODIUM LACTATE, POTASSIUM CHLORIDE, CALCIUM CHLORIDE 250 ML/HR: 20; 30; 600; 310 INJECTION, SOLUTION INTRAVENOUS at 17:30

## 2025-04-14 RX ADMIN — CALCIUM CARBONATE 1 TABLET: 500 TABLET, CHEWABLE ORAL at 20:02

## 2025-04-14 RX ADMIN — LABETALOL HYDROCHLORIDE 100 MG: 100 TABLET, FILM COATED ORAL at 08:08

## 2025-04-14 RX ADMIN — HYDROMORPHONE HYDROCHLORIDE 1 MG: 1 INJECTION, SOLUTION INTRAMUSCULAR; INTRAVENOUS; SUBCUTANEOUS at 12:57

## 2025-04-14 RX ADMIN — ONDANSETRON 4 MG: 2 INJECTION INTRAMUSCULAR; INTRAVENOUS at 13:39

## 2025-04-14 RX ADMIN — TAMSULOSIN HYDROCHLORIDE 0.4 MG: 0.4 CAPSULE ORAL at 08:08

## 2025-04-14 RX ADMIN — HYDROMORPHONE HYDROCHLORIDE 1 MG: 1 INJECTION, SOLUTION INTRAMUSCULAR; INTRAVENOUS; SUBCUTANEOUS at 15:57

## 2025-04-14 RX ADMIN — HYDROMORPHONE HYDROCHLORIDE 1 MG: 1 INJECTION, SOLUTION INTRAMUSCULAR; INTRAVENOUS; SUBCUTANEOUS at 08:48

## 2025-04-14 RX ADMIN — HYDROMORPHONE HYDROCHLORIDE 1 MG: 1 INJECTION, SOLUTION INTRAMUSCULAR; INTRAVENOUS; SUBCUTANEOUS at 17:29

## 2025-04-14 RX ADMIN — HYDROMORPHONE HYDROCHLORIDE 1 MG: 1 INJECTION, SOLUTION INTRAMUSCULAR; INTRAVENOUS; SUBCUTANEOUS at 11:04

## 2025-04-14 RX ADMIN — FAMOTIDINE 20 MG: 10 INJECTION, SOLUTION INTRAVENOUS at 13:39

## 2025-04-14 RX ADMIN — HYDROMORPHONE HYDROCHLORIDE 1 MG: 1 INJECTION, SOLUTION INTRAMUSCULAR; INTRAVENOUS; SUBCUTANEOUS at 18:30

## 2025-04-14 RX ADMIN — ACETAMINOPHEN 650 MG: 325 TABLET, FILM COATED ORAL at 04:16

## 2025-04-14 RX ADMIN — HYDROMORPHONE HYDROCHLORIDE 1 MG: 1 INJECTION, SOLUTION INTRAMUSCULAR; INTRAVENOUS; SUBCUTANEOUS at 20:02

## 2025-04-14 RX ADMIN — SODIUM CHLORIDE, SODIUM LACTATE, POTASSIUM CHLORIDE, CALCIUM CHLORIDE 250 ML/HR: 20; 30; 600; 310 INJECTION, SOLUTION INTRAVENOUS at 13:45

## 2025-04-14 RX ADMIN — ACETAMINOPHEN 650 MG: 325 TABLET, FILM COATED ORAL at 11:04

## 2025-04-14 RX ADMIN — LABETALOL HYDROCHLORIDE 100 MG: 100 TABLET, FILM COATED ORAL at 20:03

## 2025-04-14 NOTE — H&P
"[facility]  Obstetric History and Physical  Severe right sided pain      HPI:      Patient is a 29 y.o. female  currently at 21w1d, who presents with an acute onset of right sided abdominal/flank pain.  She was admitted on  with pain related to renal calculi.   She passed a small stone and had felt better.  She says she keeps seeing small pieces come out in her urine and around 2300 hours the pain became severe.   She can't sit still and appears very uncomfortable.   She denies current N/V/F/C.            The following portions of the patients history were reviewed and updated as appropriate: current medications, allergies, past medical history, past surgical history, past family history, past social history and problem list .       Prenatal Information:   Maternal Prenatal Labs  Blood Type No results found for: \"ABO\"   Rh Status No results found for: \"RH\"   Antibody Screen No results found for: \"ABSCRN\"   Gonnorhea No results found for: \"GCCX\"   Chlamydia No results found for: \"CLAMYDCU\"   RPR No results found for: \"RPR\"   Syphilis Antibody No results found for: \"SYPHILIS\"   Rubella No results found for: \"RUBELLAIGGIN\"   Hepatitis B Surface Antigen No results found for: \"HEPBSAG\"   HIV-1 Antibody No results found for: \"LABHIV1\"   Hepatitis C Antibody No results found for: \"HEPCAB\"   Rapid Urin Drug Screen No results found for: \"AMPMETHU\", \"BARBITSCNUR\", \"LABBENZSCN\", \"LABMETHSCN\", \"LABOPIASCN\", \"THCURSCR\", \"COCAINEUR\", \"AMPHETSCREEN\", \"PROPOXSCN\", \"BUPRENORSCNU\", \"METAMPSCNUR\", \"OXYCODONESCN\", \"TRICYCLICSCN\"   Group B Strep Culture No results found for: \"GBSANTIGEN\"           External Prenatal Results       Pregnancy Outside Results - Transcribed From Office Records - See Scanned Records For Details       Test Value Date Time    ABO  O  25 1537    Rh  Positive  25 1537    Antibody Screen  Negative  25 1537    Varicella IgG  Reactive  25 1537    Rubella  6.06 index 25 1537 "    Hgb  11.2 g/dL 25 0002       12.6 g/dL 25 1537      ^ 13.4 g/dL 25 0139    Hct  33.1 % 25 0002       37.7 % 25 1537      ^ 38.9 % 25 013    HgB A1c        1h GTT       3h GTT Fasting       3h GTT 1 hour       3h GTT 2 hour       3h GTT 3 hour        Gonorrhea (discrete)       Chlamydia (discrete)       RPR       Syphils cascade: TP-Ab (FTA)  Non-Reactive  25 1537    TP-Ab  Non-Reactive  25 153    TP-Ab (EIA)       TPPA       HBsAg  Non-Reactive  25 153    Herpes Simplex Virus PCR       Herpes Simplex VIrus Culture       HIV  Non-Reactive  25 153    Hep C RNA Quant PCR       Hep C Antibody  Non-Reactive  25 153    AFP       NIPT       Cystic Fibrosis (Laurie)       Cystic Fibroisis        Spinal Muscular atrophy       Fragile X       Group B Strep       GBS Susceptibility to Clindamycin       GBS Susceptibility to Erythromycin       Fetal Fibronectin       Genetic Testing, Maternal Blood                 Drug Screening       Test Value Date Time    Urine Drug Screen       Amphetamine Screen       Barbiturate Screen       Benzodiazepine Screen       Methadone Screen       Phencyclidine Screen       Opiates Screen       THC Screen       Cocaine Screen       Propoxyphene Screen       Buprenorphine Screen       Methamphetamine Screen       Oxycodone Screen       Tricyclic Antidepressants Screen                 Legend    ^: Historical                              Past OB History:     OB History    Para Term  AB Living   5 3 2 1 1 3   SAB IAB Ectopic Molar Multiple Live Births   1 0 0 0 0 1      # Outcome Date GA Lbr Noé/2nd Weight Sex Type Anes PTL Lv   5 Current            4 Term 18 37w0d   F Vag-Spont      3 Term 16 38w0d   M Vag-Spont      2 SAB            1  09 32w0d   F Vag-Spont  Y AZ       Past Medical History: Past Medical History:   Diagnosis Date    Anxiety     Chronic hypertension     Kidney  infection     Kidney stone       Past Surgical History Past Surgical History:   Procedure Laterality Date    D & C WITH SUCTION        Family History: Family History   Problem Relation Age of Onset    Hypertension Mother     Stroke Mother     Diabetes Maternal Grandmother       Social History:  reports that she has quit smoking. Her smoking use included cigarettes. She has a 1.5 pack-year smoking history. She has never used smokeless tobacco.   reports current alcohol use.   reports no history of drug use.              Objective     Vital Signs Range for the last 24 hours  Temperature: Temp:  [97.6 °F (36.4 °C)] 97.6 °F (36.4 °C)   Temp Source: Temp src: Oral   BP: BP: (138)/(84) 138/84   Pulse: Heart Rate:  [87] 87   Respirations: Resp:  [20] 20   SPO2: SpO2:  [100 %] 100 %   O2 Amount (l/min):     O2 Devices Device (Oxygen Therapy): room air   Weight:       Physical Examination: General appearance - alert, very uncomfortable and writhing   Chest - Breathing is unlaboured   Heart - Tachycardia rate   Abdomen -   Tenderness in her right side flank and abdomen    Back:  Right sided CVA tenderness   Extremities - pedal edema +1      Fetal Heart Rate Assessment   Method: Fetal HR Assessment Method: intermittent auscultation, using Doppler   Beats/min: Fetal HR (beats/min): 147   Baseline:     Varibility:     Accels:     Decels:     Tracing Category:       Uterine Assessment   Method:     Frequency (min):     Ctx Count in 10 min:     Duration:     Intensity:     Intensity by IUPC:     Resting Tone:     Resting Tone by IUPC:           Laboratory Results:     Lab Results   Component Value Date     04/14/2025    HGB 10.9 (L) 04/14/2025    HCT 31.7 (L) 04/14/2025    WBC 15.82 (H) 04/14/2025      Lab Results   Component Value Date     04/14/2025    K 4.4 04/14/2025     04/14/2025    CO2 21.0 (L) 04/14/2025    BUN 12 04/14/2025    CREATININE 1.09 (H) 04/14/2025    GLUCOSE 107 (H) 04/14/2025    ALBUMIN 3.7  04/14/2025    CALCIUM 9.1 04/14/2025    AST 29 04/14/2025    ALT 14 04/14/2025    BILITOT 0.3 04/14/2025      Lab Results   Component Value Date    SQUAMEPIUA 7-12 (A) 04/14/2025    SPECGRAVUR 1.022 04/14/2025    KETONESU Trace (A) 04/14/2025    BLOODU Moderate (2+) (A) 04/14/2025    LEUKOCYTESUR Large (3+) (A) 04/14/2025    NITRITEU Positive (A) 04/14/2025    RBCUA Unable to determine due to loaded field (A) 04/14/2025    WBCUA Too Numerous to Count (A) 04/14/2025    BACTERIA 4+ (A) 04/14/2025                Assessment:  1. Intrauterine pregnancy at 21w1d gestation  2. Renal stone       Plan:  1. Admit to APU   2. IV hydration   3. IV pain control  4. Tamsulosin   5.  IV Rocephin   6. Urology consult       Bob Ortiz MD  4/14/2025  04:22 EDT

## 2025-04-14 NOTE — PROGRESS NOTES
Patient name: Felecia Estrella  YOB: 1995   MRN: [id number]  Admission Date: 2025  Date of Service: 2025    Felecia Estrella is a 29 y.o.    at 21w1d  admitted on 2025 for Pregnancy complicated by nephrolithiasis in second trimester, antepartum    Hospital day 1    Diagnoses:   Patient Active Problem List    Diagnosis     *Pregnancy complicated by nephrolithiasis in second trimester, antepartum [O99.891, N20.0]     Nephrolithiasis [N20.0]     Anxiety [F41.9]        Subjective:      Felecia continues to have abdominal and flank pain. She has no obstetrical complaints.     REVIEW OF SYSTEMS:  Reports fetal movement is normal  Amniotic Fluid: Denies leakage of amniotic fluid.  Presence of Vaginal Bleeding Assessed: Denies vaginal bleeding  Patient Reports: No contractions  All other systems reviewed and are negative    Objective:     Vital signs:  Temp:  [97.6 °F (36.4 °C)-98 °F (36.7 °C)] 98 °F (36.7 °C)  Heart Rate:  [84-87] 84  Resp:  [18-20] 18  BP: (136-138)/(84-91) 136/91      PHYSICAL EXAM:  General appearance - alert and in no distress  Mental status - alert, oriented to person, place, and time, normal mood, behavior, speech, dress, motor activity, affect appropriate   Chest - normal respiratory effort, no respiratory distress  Heart- regular rate  Abdomen - gravid  Pelvic- exam deferred  Neurological-no focal deficits  Extremities-no pedal edema noted  Skin-normal coloration and turgor, no rashes, no suspicious skin lesions noted      Medications:  cefTRIAXone, 1,000 mg, Intravenous, Q24H  labetalol, 100 mg, Oral, Q12H  sodium chloride, 10 mL, Intravenous, Q12H  tamsulosin, 0.4 mg, Oral, Daily         acetaminophen    calcium carbonate    HYDROmorphone    ondansetron    sodium chloride    Labs:    Lab Results   Component Value Date    WBC 15.82 (H) 2025    HGB 10.9 (L) 2025    HCT 31.7 (L) 2025    MCV 90.6 2025     2025    CREATININE  1.09 (H) 04/14/2025    URICACID 3.3 01/30/2025    AST 29 04/14/2025    ALT 14 04/14/2025     01/30/2025   CT Abdomen Pelvis Without Contrast [[id number]] (Order [id number])  Order  Status: Final result     Study Result    CT ABDOMEN PELVIS WO CONTRAST     Date of Exam: 4/12/2025 12:11 AM EDT     Indication: RLQ pain with tenderness concern for appendicitis.     Comparison: 4/12/2025.     Technique: Axial CT images were obtained of the abdomen and pelvis without the administration of contrast. Reconstructed coronal and sagittal images were also obtained. Automated exposure control and iterative construction methods were used.        Findings:  Lung Bases:     The visualized lung bases and lower mediastinal structures are unremarkable.     Limited evaluation of the solid organs due to lack of intravenous contrast.  Liver:  Liver is normal in size and CT density. No focal lesions.     Biliary/Gallbladder:    The gallbladder is normal without evidence of radiopaque stones. The biliary tree is nondilated.     Spleen:  Spleen is normal in size and CT density.     Pancreas:    Pancreas is normal. There is no evidence of pancreatic mass or peripancreatic fluid.     Kidneys:    Kidneys are normal in size. There are no stones or hydronephrosis. Questionable mild wall thickening of the proximal right ureter present (series 2 image 52) with questionable mild stranding seen within the adjacent fat.     Adrenals:    Adrenal glands are unremarkable.     Retroperitoneal/Lymph Nodes/Vasculature:    No retroperitoneal adenopathy is identified.     Gastrointestinal/Mesentery:    The bowel loops are non-dilated without wall thickening or mass. The appendix appears within normal limits. No evidence of obstruction. No free air. No mesenteric fluid collections identified. No significant stool burden identified. No evidence of   hernia.     Bladder:    The bladder is normal.     Genital:     Gravid uterus present. Intrauterine contents  not evaluated on this study.          Bony Structures:     Visualized bony structures are consistent with the patient's age.        IMPRESSION:  Impression:  1.Questionable mild wall thickening of the proximal right ureter with questionable mild stranding seen within the adjacent fat. Findings may be related to a recently passed stone or ascending infection. Correlate with urinalysis. Otherwise, no acute   intra-abdominal or intrapelvic process. Appendix is within normal limits.  2.Ancillary findings as described above.                       Electronically Signed: Darcy Moffett MD    2025 12:50 AM EDT    Workstation ID: [id number]        Assessment/Plan:      Felecia is a 29 y.o.    at 21w1d.  1. Flank pain  2. UTI    Plan:   1. Maternal fetal status reassuring  2. Continue IV hydration  3. Continue pain control  4. Continue IV rocephin x 2 doses, plan to discharge home on oral abx  5. Continue flomax  6. Urology was consulted but reviewed imaging and felt that no stones were present so the consult was declined  7. Likely discharge home tomorrow     All questions were answered to the best of my ability.    Lisa Andrade MD  2025  09:11 EDT

## 2025-04-15 VITALS
RESPIRATION RATE: 20 BRPM | HEART RATE: 88 BPM | TEMPERATURE: 99 F | DIASTOLIC BLOOD PRESSURE: 58 MMHG | SYSTOLIC BLOOD PRESSURE: 103 MMHG | OXYGEN SATURATION: 100 %

## 2025-04-15 PROCEDURE — 25810000003 LACTATED RINGERS PER 1000 ML: Performed by: OBSTETRICS & GYNECOLOGY

## 2025-04-15 PROCEDURE — 96361 HYDRATE IV INFUSION ADD-ON: CPT

## 2025-04-15 PROCEDURE — G0378 HOSPITAL OBSERVATION PER HR: HCPCS

## 2025-04-15 PROCEDURE — 25010000002 CEFTRIAXONE PER 250 MG: Performed by: OBSTETRICS & GYNECOLOGY

## 2025-04-15 RX ORDER — NITROFURANTOIN 25; 75 MG/1; MG/1
100 CAPSULE ORAL DAILY
Qty: 90 CAPSULE | Refills: 2 | Status: SHIPPED | OUTPATIENT
Start: 2025-04-15

## 2025-04-15 RX ORDER — MICONAZOLE NITRATE 2 %
1 CREAM WITH APPLICATOR VAGINAL NIGHTLY
Qty: 45 G | Refills: 0 | Status: SHIPPED | OUTPATIENT
Start: 2025-04-15 | End: 2025-04-22

## 2025-04-15 RX ADMIN — TAMSULOSIN HYDROCHLORIDE 0.4 MG: 0.4 CAPSULE ORAL at 09:02

## 2025-04-15 RX ADMIN — ACETAMINOPHEN 650 MG: 325 TABLET, FILM COATED ORAL at 09:02

## 2025-04-15 RX ADMIN — LABETALOL HYDROCHLORIDE 100 MG: 100 TABLET, FILM COATED ORAL at 09:03

## 2025-04-15 RX ADMIN — ACETAMINOPHEN 650 MG: 325 TABLET, FILM COATED ORAL at 05:08

## 2025-04-15 RX ADMIN — SODIUM CHLORIDE, SODIUM LACTATE, POTASSIUM CHLORIDE, CALCIUM CHLORIDE 250 ML/HR: 20; 30; 600; 310 INJECTION, SOLUTION INTRAVENOUS at 03:43

## 2025-04-15 RX ADMIN — SODIUM CHLORIDE 1000 MG: 900 INJECTION INTRAVENOUS at 03:43

## 2025-04-15 RX ADMIN — SODIUM CHLORIDE, SODIUM LACTATE, POTASSIUM CHLORIDE, CALCIUM CHLORIDE 250 ML/HR: 20; 30; 600; 310 INJECTION, SOLUTION INTRAVENOUS at 07:25

## 2025-04-15 NOTE — DISCHARGE SUMMARY
Date of Discharge:  4/15/2025    Discharge Diagnosis: UTI     Presenting Problem/History of Present Illness  Pregnancy complicated by nephrolithiasis in second trimester, antepartum [O99.891, N20.0]       Hospital Course  Patient is a 29 y.o. female at 21w2d was admitted for UTI and flank pain. She was given 2 doses of rocephin inpatient. Reports on day of discharge she is feeling much better. Denies pain, fever chills. Denies contractions, leakage of fluid. Reports FM. Desires discharge home.       Procedures Performed         Consults:   Consults       No orders found for last 30 day(s).            Pertinent Test Results: labs:      Labs:  Lab Results   Component Value Date    WBC 15.82 (H) 04/14/2025    HGB 10.9 (L) 04/14/2025    HCT 31.7 (L) 04/14/2025    MCV 90.6 04/14/2025     04/14/2025    CREATININE 1.09 (H) 04/14/2025    URICACID 3.3 01/30/2025    AST 29 04/14/2025    ALT 14 04/14/2025     01/30/2025     Results from last 7 days   Lab Units 04/14/25  1031   ABO TYPING  O   RH TYPING  Positive   ANTIBODY SCREEN  Negative       Discharge Disposition:  To Home    Condition on Discharge:  Stable    Vital Signs  Temp:  [97.6 °F (36.4 °C)-99.2 °F (37.3 °C)] 99 °F (37.2 °C)  Heart Rate:  [] 88  Resp:  [16-20] 20  BP: (103-129)/(58-78) 103/58      Physical Examination  Constitutional: A&Ox3. No apparent distress. Doing well.  HEENT: Normocephalic, atraumatic.  Neurological: Negative for sensory or motor deficit  Cardiovascular: RRR; negative for murmur, rubs, gallops  Respiratory: Clear to auscultation bilaterally; negative for rales, crackles or wheezes  Abdominal: gravid, nontender, soft, negative for guarding, rebound  Extremities: negative for edema and tenderness, negative Tisha's  Skin: normal turgor; negative for rash or lesions  Psychiatric: normal affect, normal thought process, good insight, good judgment      Discharge Disposition  Home or Self Care    Discharge Medications      Discharge Medications        New Medications        Instructions Start Date   miconazole 2 % vaginal cream  Commonly known as: MICOTIN   1 applicator, Vaginal, Nightly      nitrofurantoin (macrocrystal-monohydrate) 100 MG capsule  Commonly known as: Macrobid   100 mg, Oral, Daily             Continue These Medications        Instructions Start Date   escitalopram 10 MG tablet  Commonly known as: Lexapro   10 mg, Oral, Daily      labetalol 100 MG tablet  Commonly known as: NORMODYNE   100 mg, 2 Times Daily      Lo Loestrin Fe 1 MG-10 MCG / 10 MCG tablet  Generic drug: Norethin-Eth Estrad-Fe Biphas   1 tablet, Oral, Daily               Discharge Diet: regular    Activity at Discharge: normal    Follow-up Appointments  Next week at [facility]    Test Results Pending at Discharge       Josephine Vargas DO  04/15/25  09:04 EDT    Time: Discharge 20 min

## 2025-06-19 ENCOUNTER — LAB (OUTPATIENT)
Dept: LAB | Facility: HOSPITAL | Age: 30
End: 2025-06-19
Payer: COMMERCIAL

## 2025-06-19 ENCOUNTER — TRANSCRIBE ORDERS (OUTPATIENT)
Dept: LAB | Facility: HOSPITAL | Age: 30
End: 2025-06-19
Payer: COMMERCIAL

## 2025-06-19 DIAGNOSIS — Z34.82 PRENATAL CARE, SUBSEQUENT PREGNANCY, SECOND TRIMESTER: Primary | ICD-10-CM

## 2025-06-20 ENCOUNTER — LAB (OUTPATIENT)
Dept: LAB | Facility: HOSPITAL | Age: 30
End: 2025-06-20
Payer: COMMERCIAL

## 2025-06-20 ENCOUNTER — TRANSCRIBE ORDERS (OUTPATIENT)
Dept: LAB | Facility: HOSPITAL | Age: 30
End: 2025-06-20
Payer: COMMERCIAL

## 2025-06-20 DIAGNOSIS — I10 ESSENTIAL HYPERTENSION, MALIGNANT: ICD-10-CM

## 2025-06-20 DIAGNOSIS — Z3A.22 22 WEEKS GESTATION OF PREGNANCY: ICD-10-CM

## 2025-06-20 DIAGNOSIS — Z87.440 PERSONAL HISTORY OF URINARY (TRACT) INFECTION: ICD-10-CM

## 2025-06-20 DIAGNOSIS — O09.299 PREGNANCY WITH HISTORY OF ABORTION, ANTEPARTUM: ICD-10-CM

## 2025-06-20 DIAGNOSIS — Z34.82 PRENATAL CARE, SUBSEQUENT PREGNANCY, SECOND TRIMESTER: ICD-10-CM

## 2025-06-20 DIAGNOSIS — Z34.82 PRENATAL CARE, SUBSEQUENT PREGNANCY, SECOND TRIMESTER: Primary | ICD-10-CM

## 2025-06-20 LAB
BLD GP AB SCN SERPL QL: NEGATIVE
DEPRECATED RDW RBC AUTO: 41.2 FL (ref 37–54)
ERYTHROCYTE [DISTWIDTH] IN BLOOD BY AUTOMATED COUNT: 12 % (ref 12.3–15.4)
GLUCOSE 1H P 100 G GLC PO SERPL-MCNC: 157 MG/DL (ref 65–139)
HCT VFR BLD AUTO: 36.2 % (ref 34–46.6)
HGB BLD-MCNC: 11.9 G/DL (ref 12–15.9)
MCH RBC QN AUTO: 31.2 PG (ref 26.6–33)
MCHC RBC AUTO-ENTMCNC: 32.9 G/DL (ref 31.5–35.7)
MCV RBC AUTO: 94.8 FL (ref 79–97)
PLATELET # BLD AUTO: 238 10*3/MM3 (ref 140–450)
PMV BLD AUTO: 12 FL (ref 6–12)
RBC # BLD AUTO: 3.82 10*6/MM3 (ref 3.77–5.28)
TREPONEMA PALLIDUM IGG+IGM AB [PRESENCE] IN SERUM OR PLASMA BY IMMUNOASSAY: NORMAL
WBC NRBC COR # BLD AUTO: 11.12 10*3/MM3 (ref 3.4–10.8)

## 2025-06-20 PROCEDURE — 36415 COLL VENOUS BLD VENIPUNCTURE: CPT

## 2025-06-20 PROCEDURE — 86850 RBC ANTIBODY SCREEN: CPT

## 2025-06-20 PROCEDURE — 82948 REAGENT STRIP/BLOOD GLUCOSE: CPT | Performed by: NURSE PRACTITIONER

## 2025-06-20 PROCEDURE — 86780 TREPONEMA PALLIDUM: CPT

## 2025-06-20 PROCEDURE — 82950 GLUCOSE TEST: CPT

## 2025-06-20 PROCEDURE — 85027 COMPLETE CBC AUTOMATED: CPT

## 2025-06-20 PROCEDURE — 87086 URINE CULTURE/COLONY COUNT: CPT

## 2025-06-21 LAB — BACTERIA SPEC AEROBE CULT: NORMAL

## 2025-07-10 ENCOUNTER — HOSPITAL ENCOUNTER (EMERGENCY)
Facility: HOSPITAL | Age: 30
Discharge: HOME | End: 2025-07-10
Attending: ADVANCED PRACTICE MIDWIFE | Admitting: OBSTETRICS & GYNECOLOGY
Payer: COMMERCIAL

## 2025-07-10 VITALS
HEART RATE: 91 BPM | DIASTOLIC BLOOD PRESSURE: 106 MMHG | SYSTOLIC BLOOD PRESSURE: 142 MMHG | OXYGEN SATURATION: 100 % | TEMPERATURE: 98.2 F

## 2025-07-10 LAB
ALP SERPL-CCNC: 126 U/L (ref 39–117)
ALT SERPL W P-5'-P-CCNC: 8 U/L (ref 1–33)
AST SERPL-CCNC: 17 U/L (ref 1–32)
BILIRUB SERPL-MCNC: 0.2 MG/DL (ref 0–1.2)
CREAT SERPL-MCNC: 0.98 MG/DL (ref 0.57–1)
DEPRECATED RDW RBC AUTO: 45.5 FL (ref 37–54)
ERYTHROCYTE [DISTWIDTH] IN BLOOD BY AUTOMATED COUNT: 13.3 % (ref 12.3–15.4)
EXPIRATION DATE: ABNORMAL
HCT VFR BLD AUTO: 31.3 % (ref 34–46.6)
HGB BLD-MCNC: 10.3 G/DL (ref 12–15.9)
LDH SERPL-CCNC: 170 U/L (ref 135–214)
Lab: ABNORMAL
MCH RBC QN AUTO: 30.6 PG (ref 26.6–33)
MCHC RBC AUTO-ENTMCNC: 32.9 G/DL (ref 31.5–35.7)
MCV RBC AUTO: 92.9 FL (ref 79–97)
PLATELET # BLD AUTO: 213 10*3/MM3 (ref 140–450)
PMV BLD AUTO: 11.7 FL (ref 6–12)
PROT UR STRIP-MCNC: ABNORMAL MG/DL
RBC # BLD AUTO: 3.37 10*6/MM3 (ref 3.77–5.28)
URATE SERPL-MCNC: 4.5 MG/DL (ref 2.4–5.7)
WBC NRBC COR # BLD AUTO: 11.46 10*3/MM3 (ref 3.4–10.8)

## 2025-07-10 PROCEDURE — 83615 LACTATE (LD) (LDH) ENZYME: CPT | Performed by: OBSTETRICS & GYNECOLOGY

## 2025-07-10 PROCEDURE — 59025 FETAL NON-STRESS TEST: CPT | Performed by: OBSTETRICS & GYNECOLOGY

## 2025-07-10 PROCEDURE — 85027 COMPLETE CBC AUTOMATED: CPT | Performed by: OBSTETRICS & GYNECOLOGY

## 2025-07-10 PROCEDURE — 84460 ALANINE AMINO (ALT) (SGPT): CPT | Performed by: OBSTETRICS & GYNECOLOGY

## 2025-07-10 PROCEDURE — 82247 BILIRUBIN TOTAL: CPT | Performed by: OBSTETRICS & GYNECOLOGY

## 2025-07-10 PROCEDURE — 84450 TRANSFERASE (AST) (SGOT): CPT | Performed by: OBSTETRICS & GYNECOLOGY

## 2025-07-10 PROCEDURE — 84075 ASSAY ALKALINE PHOSPHATASE: CPT | Performed by: OBSTETRICS & GYNECOLOGY

## 2025-07-10 PROCEDURE — 99283 EMERGENCY DEPT VISIT LOW MDM: CPT | Performed by: OBSTETRICS & GYNECOLOGY

## 2025-07-10 PROCEDURE — 84550 ASSAY OF BLOOD/URIC ACID: CPT | Performed by: OBSTETRICS & GYNECOLOGY

## 2025-07-10 PROCEDURE — 81002 URINALYSIS NONAUTO W/O SCOPE: CPT | Performed by: OBSTETRICS & GYNECOLOGY

## 2025-07-10 PROCEDURE — 99284 EMERGENCY DEPT VISIT MOD MDM: CPT | Performed by: OBSTETRICS & GYNECOLOGY

## 2025-07-10 PROCEDURE — 82565 ASSAY OF CREATININE: CPT | Performed by: OBSTETRICS & GYNECOLOGY

## 2025-07-10 PROCEDURE — 59025 FETAL NON-STRESS TEST: CPT

## 2025-07-10 RX ORDER — PRENATAL WITH FERROUS FUM AND FOLIC ACID 3080; 920; 120; 400; 22; 1.84; 3; 20; 10; 1; 12; 200; 27; 25; 2 [IU]/1; [IU]/1; MG/1; [IU]/1; MG/1; MG/1; MG/1; MG/1; MG/1; MG/1; UG/1; MG/1; MG/1; MG/1; MG/1
1 TABLET ORAL DAILY
COMMUNITY

## 2025-07-10 RX ORDER — LABETALOL 100 MG/1
200 TABLET, FILM COATED ORAL EVERY 8 HOURS
Qty: 180 TABLET | Refills: 0 | Status: SHIPPED | OUTPATIENT
Start: 2025-07-10

## 2025-07-10 RX ORDER — ACETAMINOPHEN 500 MG
1000 TABLET ORAL EVERY 6 HOURS PRN
COMMUNITY

## 2025-07-10 RX ORDER — BUTALBITAL, ACETAMINOPHEN AND CAFFEINE 50; 325; 40 MG/1; MG/1; MG/1
2 TABLET ORAL EVERY 4 HOURS PRN
Status: DISCONTINUED | OUTPATIENT
Start: 2025-07-10 | End: 2025-07-10 | Stop reason: HOSPADM

## 2025-07-10 RX ADMIN — BUTALBITAL, ACETAMINOPHEN, AND CAFFEINE 2 TABLET: 325; 50; 40 TABLET ORAL at 01:00

## 2025-07-10 NOTE — H&P
"[facility]  Obstetric History and Physical    Referring Provider: Michelle Aden CNM      Chief Complaint   Patient presents with    Elevated Blood Pressure       HPI:      Patient is a 30 y.o. female  currently at 33w4d, who presents with a headache and high blood pressures.   She has chronic HTN and is currently on Labetalol 100 mg BID.   She says she gets a lot of headaches, but usually takes Tylenol to improve it.  She took Tylenol around 1600 hours, but it has not helped much this time.   She received PO hydration and Fioricet which did improve her headache significantly.   She denies RUQ pain and vision changes.   She denies that there are any other pregnancy complaints.    She received 2 Fioricet while waiting for labs and her headache got significantly improved.             Prenatal Information:   Maternal Prenatal Labs  Blood Type No results found for: \"ABO\"   Rh Status No results found for: \"RH\"   Antibody Screen No results found for: \"ABSCRN\"   Gonnorhea No results found for: \"GCCX\"   Chlamydia No results found for: \"CLAMYDCU\"   RPR No results found for: \"RPR\"   Syphilis Antibody No results found for: \"SYPHILIS\"   Rubella No results found for: \"RUBELLAIGGIN\"   Hepatitis B Surface Antigen No results found for: \"HEPBSAG\"   HIV-1 Antibody No results found for: \"LABHIV1\"   Hepatitis C Antibody No results found for: \"HEPCAB\"   Rapid Urin Drug Screen No results found for: \"AMPMETHU\", \"BARBITSCNUR\", \"LABBENZSCN\", \"LABMETHSCN\", \"LABOPIASCN\", \"THCURSCR\", \"COCAINEUR\", \"AMPHETSCREEN\", \"PROPOXSCN\", \"BUPRENORSCNU\", \"METAMPSCNUR\", \"OXYCODONESCN\", \"TRICYCLICSCN\"   Group B Strep Culture No results found for: \"GBSANTIGEN\"           External Prenatal Results       Pregnancy Outside Results - Transcribed From Office Records - See Scanned Records For Details       Test Value Date Time    ABO  O  25 1031    Rh  Positive  25 1031    Antibody Screen  Negative  25 1158       Negative  25 1031 "       Negative  25 1537    Varicella IgG  Reactive  25 1537    Rubella  6.06 index 25 1537    Hgb  10.3 g/dL 07/10/25 0044       11.9 g/dL 25 1158       10.9 g/dL 25 0304       11.2 g/dL 25 0002       12.6 g/dL 25 1537      ^ 13.4 g/dL 25 0139    Hct  31.3 % 07/10/25 0044       36.2 % 25 1158       31.7 % 25 0304       33.1 % 25 0002       37.7 % 25 1537      ^ 38.9 % 25 013    HgB A1c        1h GTT  157 mg/dL 25 1158    3h GTT Fasting       3h GTT 1 hour       3h GTT 2 hour       3h GTT 3 hour        Gonorrhea (discrete)       Chlamydia (discrete)       RPR       Syphils cascade: TP-Ab (FTA)  Non-Reactive  25 1158       Non-Reactive  25 1537    TP-Ab  Non-Reactive  25 1158       Non-Reactive  25 1537    TP-Ab (EIA)       TPPA       HBsAg  Non-Reactive  25 1537    Herpes Simplex Virus PCR       Herpes Simplex VIrus Culture       HIV  Non-Reactive  25 1537    Hep C RNA Quant PCR       Hep C Antibody  Non-Reactive  25 1537    AFP       NIPT       Cystic Fibrosis (Laurie)       Cystic Fibroisis        Spinal Muscular atrophy       Fragile X       Group B Strep       GBS Susceptibility to Clindamycin       GBS Susceptibility to Erythromycin       Fetal Fibronectin       Genetic Testing, Maternal Blood                 Drug Screening       Test Value Date Time    Urine Drug Screen       Amphetamine Screen       Barbiturate Screen       Benzodiazepine Screen       Methadone Screen       Phencyclidine Screen       Opiates Screen       THC Screen       Cocaine Screen       Propoxyphene Screen       Buprenorphine Screen       Methamphetamine Screen       Oxycodone Screen       Tricyclic Antidepressants Screen                 Legend    ^: Historical                              Past OB History:       OB History    Para Term  AB Living   5 3 2 1 1 3   SAB IAB Ectopic Molar Multiple Live  Births   1 0 0 0 0 1      # Outcome Date GA Lbr Noé/2nd Weight Sex Type Anes PTL Lv   5 Current            4 Term 18 37w0d   F Vag-Spont      3 Term 16 38w0d   M Vag-Spont      2 SAB            1  09 32w0d   F Vag-Spont  Y AZ       Past Medical History: Past Medical History:   Diagnosis Date    Anxiety     Chronic hypertension     Kidney infection     Kidney stone       Past Surgical History Past Surgical History:   Procedure Laterality Date    D & C WITH SUCTION        Family History: Family History   Problem Relation Age of Onset    Hypertension Mother     Stroke Mother     Diabetes Maternal Grandmother       Social History:  reports that she has quit smoking. Her smoking use included cigarettes. She has a 1.5 pack-year smoking history. She has never used smokeless tobacco.   reports that she does not currently use alcohol.   reports no history of drug use.                   General ROS Negative Findings:  All pertinent positives and negatives are addressed in the HPI        Vital Signs Range for the last 24 hours  Temperature: Temp:  [98.2 °F (36.8 °C)] 98.2 °F (36.8 °C)   Temp Source: Temp src: Oral   BP: BP: (142-155)/(100-107) 142/106   Pulse: Heart Rate:  [87-99] 91   Respirations:  16   SPO2: SpO2:  [99 %-100 %] 100 %   O2 Amount (l/min):     O2 Devices     Weight:       Physical Examination:   General:   alert, appears stated age, and cooperative            Chest: CTAB    Heart:   Regular rate    Abdominal  Soft, non-tender,  gravid uterus    Lower Extremities: +2                         Fetal Heart Rate Assessment   Method: Fetal HR Assessment Method: external   Beats/min: Fetal HR (beats/min): 135   Baseline: Fetal HR Baseline: normal range   Varibility: Fetal HR Variability: moderate (amplitude range 6 to 25 bpm)   Accels: Fetal HR Accelerations: greater than/equal to 15 bpm, lasting at least 15 seconds   Decels: Fetal HR Decelerations: absent   Tracing Category:        Uterine Assessment   Method: Method: external tocotransducer   Frequency (min):     Ctx Count in 10 min:     Duration:     Intensity: Contraction Intensity: no contractions   Intensity by IUPC:     Resting Tone: Uterine Resting Tone: soft by palpation   Resting Tone by IUPC:     [place] Units:       Laboratory Results:   Lab Results (last 24 hours)       Procedure Component Value Units Date/Time    Preeclampsia Panel [[id number]]  (Abnormal) Collected: 07/10/25 0044    Specimen: Blood Updated: 07/10/25 0117     Alkaline Phosphatase 126 U/L      ALT (SGPT) 8 U/L      AST (SGOT) 17 U/L      Creatinine 0.98 mg/dL      Total Bilirubin 0.2 mg/dL       U/L      Uric Acid 4.5 mg/dL     CBC (No Diff) [[id number]]  (Abnormal) Collected: 07/10/25 0044    Specimen: Blood Updated: 07/10/25 0056     WBC 11.46 10*3/mm3      RBC 3.37 10*6/mm3      Hemoglobin 10.3 g/dL      Hematocrit 31.3 %      MCV 92.9 fL      MCH 30.6 pg      MCHC 32.9 g/dL      RDW 13.3 %      RDW-SD 45.5 fl      MPV 11.7 fL      Platelets 213 10*3/mm3     POC Protein, Urine, Qualitative, Dipstick [[id number]]  (Abnormal) Collected: 07/10/25 0019    Specimen: Urine Updated: 07/10/25 0020     Protein, POC 1+ mg/dL      Lot Number [id number]     Expiration Date 2027-05-31          Radiology Review:   Imaging Results (Last 24 Hours)       ** No results found for the last 24 hours. **          Other Studies:     NST:     Indication:                  Start time: 0110                     End time: 0150   Accels:Y   Decels:N               BPM: 130s.     CAT:  I                 NST  -     Reactive NST    Assessment & Plan           MATT Course / Assessment/Plan  1.   All lab and imaging results listed above have been reviewed by me.  2.   Intrauterine pregnancy at 33w4d gestation with reactive fetal status.    3.   Headache - Improved with 2 Fioricet and PO hydration  4.  CHTN - Increase Labetalol from 100 mg BID to 200 mg Q 8 hours   5.  Discussed S/S to be  concerned for and to return for further evaluation   6.  Given education and Reassurance   7.  Questions answered   8.  D/C Home           Bob Ortiz MD  7/10/2025  02:01 EDT

## 2025-07-12 ENCOUNTER — LAB (OUTPATIENT)
Dept: LAB | Facility: HOSPITAL | Age: 30
End: 2025-07-12
Payer: COMMERCIAL

## 2025-07-12 ENCOUNTER — TRANSCRIBE ORDERS (OUTPATIENT)
Dept: LAB | Facility: HOSPITAL | Age: 30
End: 2025-07-12
Payer: COMMERCIAL

## 2025-07-12 DIAGNOSIS — Z34.82 PRENATAL CARE, SUBSEQUENT PREGNANCY, SECOND TRIMESTER: ICD-10-CM

## 2025-07-12 DIAGNOSIS — Z34.82 PRENATAL CARE, SUBSEQUENT PREGNANCY, SECOND TRIMESTER: Primary | ICD-10-CM

## 2025-07-12 LAB
GLUCOSE 1H P 100 G GLC PO SERPL-MCNC: 186 MG/DL (ref 74–180)
GLUCOSE 2H P 100 G GLC PO SERPL-MCNC: 115 MG/DL (ref 74–155)
GLUCOSE 3H P 100 G GLC PO SERPL-MCNC: 126 MG/DL (ref 74–140)
GLUCOSE P FAST SERPL-MCNC: 106 MG/DL (ref 74–106)

## 2025-07-12 PROCEDURE — 36415 COLL VENOUS BLD VENIPUNCTURE: CPT

## 2025-07-12 PROCEDURE — 82952 GTT-ADDED SAMPLES: CPT

## 2025-07-12 PROCEDURE — 82948 REAGENT STRIP/BLOOD GLUCOSE: CPT | Performed by: NURSE PRACTITIONER

## 2025-07-12 PROCEDURE — 82951 GLUCOSE TOLERANCE TEST (GTT): CPT

## 2025-07-22 ENCOUNTER — HOSPITAL ENCOUNTER (INPATIENT)
Facility: HOSPITAL | Age: 30
LOS: 3 days | Discharge: HOME | End: 2025-07-25
Attending: ADVANCED PRACTICE MIDWIFE | Admitting: STUDENT IN AN ORGANIZED HEALTH CARE EDUCATION/TRAINING PROGRAM
Payer: COMMERCIAL

## 2025-07-22 ENCOUNTER — ANESTHESIA (OUTPATIENT)
Dept: LABOR AND DELIVERY | Facility: HOSPITAL | Age: 30
End: 2025-07-22
Payer: COMMERCIAL

## 2025-07-22 ENCOUNTER — ANESTHESIA EVENT (OUTPATIENT)
Dept: LABOR AND DELIVERY | Facility: HOSPITAL | Age: 30
End: 2025-07-22
Payer: COMMERCIAL

## 2025-07-22 PROBLEM — O24.410 DIET CONTROLLED GESTATIONAL DIABETES MELLITUS (GDM) IN THIRD TRIMESTER: Status: ACTIVE | Noted: 2025-07-22

## 2025-07-22 PROBLEM — O14.10 PREECLAMPSIA, SEVERE: Status: ACTIVE | Noted: 2025-07-22

## 2025-07-22 LAB
ABO GROUP BLD: NORMAL
ALP SERPL-CCNC: 143 U/L (ref 39–117)
ALT SERPL W P-5'-P-CCNC: 9 U/L (ref 1–33)
AST SERPL-CCNC: 25 U/L (ref 1–32)
BILIRUB SERPL-MCNC: 0.2 MG/DL (ref 0–1.2)
BLD GP AB SCN SERPL QL: NEGATIVE
CREAT SERPL-MCNC: 0.93 MG/DL (ref 0.57–1)
DEPRECATED RDW RBC AUTO: 44.6 FL (ref 37–54)
ERYTHROCYTE [DISTWIDTH] IN BLOOD BY AUTOMATED COUNT: 13.2 % (ref 12.3–15.4)
EXPIRATION DATE: ABNORMAL
GLUCOSE BLDC GLUCOMTR-MCNC: 116 MG/DL (ref 70–130)
HCT VFR BLD AUTO: 32.9 % (ref 34–46.6)
HGB BLD-MCNC: 10.9 G/DL (ref 12–15.9)
LDH SERPL-CCNC: 250 U/L (ref 135–214)
Lab: ABNORMAL
MCH RBC QN AUTO: 30.3 PG (ref 26.6–33)
MCHC RBC AUTO-ENTMCNC: 33.1 G/DL (ref 31.5–35.7)
MCV RBC AUTO: 91.4 FL (ref 79–97)
PLATELET # BLD AUTO: 224 10*3/MM3 (ref 140–450)
PMV BLD AUTO: 12 FL (ref 6–12)
PROT UR STRIP-MCNC: ABNORMAL MG/DL
RBC # BLD AUTO: 3.6 10*6/MM3 (ref 3.77–5.28)
RH BLD: POSITIVE
T&S EXPIRATION DATE: NORMAL
URATE SERPL-MCNC: 5.3 MG/DL (ref 2.4–5.7)
WBC NRBC COR # BLD AUTO: 11.06 10*3/MM3 (ref 3.4–10.8)

## 2025-07-22 PROCEDURE — 86901 BLOOD TYPING SEROLOGIC RH(D): CPT | Performed by: ADVANCED PRACTICE MIDWIFE

## 2025-07-22 PROCEDURE — 25010000002 LIDOCAINE-EPINEPHRINE (PF) 1.5 %-1:200000 SOLUTION: Performed by: ANESTHESIOLOGY

## 2025-07-22 PROCEDURE — 25010000002 ONDANSETRON PER 1 MG: Performed by: ANESTHESIOLOGY

## 2025-07-22 PROCEDURE — 84460 ALANINE AMINO (ALT) (SGPT): CPT | Performed by: ADVANCED PRACTICE MIDWIFE

## 2025-07-22 PROCEDURE — 82948 REAGENT STRIP/BLOOD GLUCOSE: CPT

## 2025-07-22 PROCEDURE — 25010000002 FENTANYL CITRATE (PF) 50 MCG/ML SOLUTION: Performed by: ANESTHESIOLOGY

## 2025-07-22 PROCEDURE — 59200 INSERT CERVICAL DILATOR: CPT | Performed by: OBSTETRICS & GYNECOLOGY

## 2025-07-22 PROCEDURE — 85027 COMPLETE CBC AUTOMATED: CPT | Performed by: ADVANCED PRACTICE MIDWIFE

## 2025-07-22 PROCEDURE — 25810000003 LACTATED RINGERS PER 1000 ML: Performed by: ADVANCED PRACTICE MIDWIFE

## 2025-07-22 PROCEDURE — 84450 TRANSFERASE (AST) (SGOT): CPT | Performed by: ADVANCED PRACTICE MIDWIFE

## 2025-07-22 PROCEDURE — 25010000002 LABETALOL 5 MG/ML SOLUTION

## 2025-07-22 PROCEDURE — 25010000002 CEFAZOLIN PER 500 MG: Performed by: ADVANCED PRACTICE MIDWIFE

## 2025-07-22 PROCEDURE — C1755 CATHETER, INTRASPINAL: HCPCS | Performed by: ANESTHESIOLOGY

## 2025-07-22 PROCEDURE — 84075 ASSAY ALKALINE PHOSPHATASE: CPT | Performed by: ADVANCED PRACTICE MIDWIFE

## 2025-07-22 PROCEDURE — 83615 LACTATE (LD) (LDH) ENZYME: CPT | Performed by: ADVANCED PRACTICE MIDWIFE

## 2025-07-22 PROCEDURE — 25010000002 FAMOTIDINE 10 MG/ML SOLUTION: Performed by: ANESTHESIOLOGY

## 2025-07-22 PROCEDURE — 86850 RBC ANTIBODY SCREEN: CPT | Performed by: ADVANCED PRACTICE MIDWIFE

## 2025-07-22 PROCEDURE — 82247 BILIRUBIN TOTAL: CPT | Performed by: ADVANCED PRACTICE MIDWIFE

## 2025-07-22 PROCEDURE — 25010000002 BUTORPHANOL PER 1 MG: Performed by: STUDENT IN AN ORGANIZED HEALTH CARE EDUCATION/TRAINING PROGRAM

## 2025-07-22 PROCEDURE — 86900 BLOOD TYPING SEROLOGIC ABO: CPT | Performed by: ADVANCED PRACTICE MIDWIFE

## 2025-07-22 PROCEDURE — 25010000002 ROPIVACAINE PER 1 MG: Performed by: ANESTHESIOLOGY

## 2025-07-22 PROCEDURE — 81002 URINALYSIS NONAUTO W/O SCOPE: CPT | Performed by: STUDENT IN AN ORGANIZED HEALTH CARE EDUCATION/TRAINING PROGRAM

## 2025-07-22 PROCEDURE — 86780 TREPONEMA PALLIDUM: CPT | Performed by: ADVANCED PRACTICE MIDWIFE

## 2025-07-22 PROCEDURE — 82565 ASSAY OF CREATININE: CPT | Performed by: ADVANCED PRACTICE MIDWIFE

## 2025-07-22 PROCEDURE — 84550 ASSAY OF BLOOD/URIC ACID: CPT | Performed by: ADVANCED PRACTICE MIDWIFE

## 2025-07-22 RX ORDER — MAGNESIUM SULFATE HEPTAHYDRATE 40 MG/ML
INJECTION, SOLUTION INTRAVENOUS
Status: COMPLETED
Start: 2025-07-22 | End: 2025-07-22

## 2025-07-22 RX ORDER — SODIUM CHLORIDE 0.9 % (FLUSH) 0.9 %
10 SYRINGE (ML) INJECTION AS NEEDED
Status: DISCONTINUED | OUTPATIENT
Start: 2025-07-22 | End: 2025-07-23

## 2025-07-22 RX ORDER — ONDANSETRON 4 MG/1
4 TABLET, ORALLY DISINTEGRATING ORAL EVERY 6 HOURS PRN
Status: DISCONTINUED | OUTPATIENT
Start: 2025-07-22 | End: 2025-07-23

## 2025-07-22 RX ORDER — FAMOTIDINE 10 MG/ML
20 INJECTION, SOLUTION INTRAVENOUS EVERY 12 HOURS SCHEDULED
Status: DISCONTINUED | OUTPATIENT
Start: 2025-07-22 | End: 2025-07-25

## 2025-07-22 RX ORDER — SODIUM CHLORIDE 9 MG/ML
40 INJECTION, SOLUTION INTRAVENOUS AS NEEDED
Status: DISCONTINUED | OUTPATIENT
Start: 2025-07-22 | End: 2025-07-23

## 2025-07-22 RX ORDER — BUTORPHANOL TARTRATE 2 MG/ML
2 INJECTION, SOLUTION INTRAMUSCULAR; INTRAVENOUS
Status: DISCONTINUED | OUTPATIENT
Start: 2025-07-22 | End: 2025-07-23

## 2025-07-22 RX ORDER — ACETAMINOPHEN 325 MG/1
650 TABLET ORAL EVERY 4 HOURS PRN
Status: DISCONTINUED | OUTPATIENT
Start: 2025-07-22 | End: 2025-07-23

## 2025-07-22 RX ORDER — LABETALOL HYDROCHLORIDE 5 MG/ML
20-80 INJECTION, SOLUTION INTRAVENOUS
Status: ACTIVE | OUTPATIENT
Start: 2025-07-22 | End: 2025-07-23

## 2025-07-22 RX ORDER — SODIUM CHLORIDE, SODIUM LACTATE, POTASSIUM CHLORIDE, CALCIUM CHLORIDE 600; 310; 30; 20 MG/100ML; MG/100ML; MG/100ML; MG/100ML
100 INJECTION, SOLUTION INTRAVENOUS CONTINUOUS
Status: ACTIVE | OUTPATIENT
Start: 2025-07-22 | End: 2025-07-24

## 2025-07-22 RX ORDER — LIDOCAINE HYDROCHLORIDE AND EPINEPHRINE 15; 5 MG/ML; UG/ML
INJECTION, SOLUTION EPIDURAL AS NEEDED
Status: DISCONTINUED | OUTPATIENT
Start: 2025-07-22 | End: 2025-07-23 | Stop reason: SURG

## 2025-07-22 RX ORDER — LABETALOL HYDROCHLORIDE 5 MG/ML
INJECTION, SOLUTION INTRAVENOUS
Status: COMPLETED
Start: 2025-07-22 | End: 2025-07-22

## 2025-07-22 RX ORDER — EPHEDRINE SULFATE 5 MG/ML
INJECTION INTRAVENOUS
Status: DISCONTINUED
Start: 2025-07-22 | End: 2025-07-25 | Stop reason: HOSPADM

## 2025-07-22 RX ORDER — LIDOCAINE HYDROCHLORIDE 10 MG/ML
0.5 INJECTION, SOLUTION EPIDURAL; INFILTRATION; INTRACAUDAL; PERINEURAL ONCE AS NEEDED
Status: DISCONTINUED | OUTPATIENT
Start: 2025-07-22 | End: 2025-07-23

## 2025-07-22 RX ORDER — FENTANYL CITRATE 50 UG/ML
INJECTION, SOLUTION INTRAMUSCULAR; INTRAVENOUS AS NEEDED
Status: DISCONTINUED | OUTPATIENT
Start: 2025-07-22 | End: 2025-07-23 | Stop reason: SURG

## 2025-07-22 RX ORDER — OXYTOCIN/0.9 % SODIUM CHLORIDE 30/500 ML
2-20 PLASTIC BAG, INJECTION (ML) INTRAVENOUS
Status: DISCONTINUED | OUTPATIENT
Start: 2025-07-22 | End: 2025-07-25 | Stop reason: HOSPADM

## 2025-07-22 RX ORDER — MAGNESIUM SULFATE HEPTAHYDRATE 40 MG/ML
1 INJECTION, SOLUTION INTRAVENOUS CONTINUOUS
Status: DISPENSED | OUTPATIENT
Start: 2025-07-22 | End: 2025-07-24

## 2025-07-22 RX ORDER — ONDANSETRON 2 MG/ML
4 INJECTION INTRAMUSCULAR; INTRAVENOUS EVERY 6 HOURS PRN
Status: DISCONTINUED | OUTPATIENT
Start: 2025-07-22 | End: 2025-07-23

## 2025-07-22 RX ORDER — VANCOMYCIN 2 GRAM/500 ML IN 0.9 % SODIUM CHLORIDE INTRAVENOUS
20 EVERY 12 HOURS
Status: DISCONTINUED | OUTPATIENT
Start: 2025-07-22 | End: 2025-07-22 | Stop reason: ALTCHOICE

## 2025-07-22 RX ORDER — METOCLOPRAMIDE HYDROCHLORIDE 5 MG/ML
10 INJECTION INTRAMUSCULAR; INTRAVENOUS ONCE AS NEEDED
Status: COMPLETED | OUTPATIENT
Start: 2025-07-22 | End: 2025-07-23

## 2025-07-22 RX ORDER — CALCIUM GLUCONATE 98 MG/ML
1 INJECTION, SOLUTION INTRAVENOUS AS NEEDED
Status: DISCONTINUED | OUTPATIENT
Start: 2025-07-22 | End: 2025-07-24

## 2025-07-22 RX ORDER — BUTORPHANOL TARTRATE 1 MG/ML
1 INJECTION, SOLUTION INTRAMUSCULAR; INTRAVENOUS
Status: DISCONTINUED | OUTPATIENT
Start: 2025-07-22 | End: 2025-07-23

## 2025-07-22 RX ORDER — OXYTOCIN/0.9 % SODIUM CHLORIDE 30/500 ML
PLASTIC BAG, INJECTION (ML) INTRAVENOUS
Status: COMPLETED
Start: 2025-07-22 | End: 2025-07-22

## 2025-07-22 RX ORDER — SODIUM CHLORIDE 0.9 % (FLUSH) 0.9 %
10 SYRINGE (ML) INJECTION EVERY 12 HOURS SCHEDULED
Status: DISCONTINUED | OUTPATIENT
Start: 2025-07-22 | End: 2025-07-23

## 2025-07-22 RX ORDER — CITRIC ACID/SODIUM CITRATE 334-500MG
30 SOLUTION, ORAL ORAL ONCE
Status: DISCONTINUED | OUTPATIENT
Start: 2025-07-22 | End: 2025-07-23

## 2025-07-22 RX ORDER — TERBUTALINE SULFATE 1 MG/ML
0.25 INJECTION SUBCUTANEOUS AS NEEDED
Status: DISCONTINUED | OUTPATIENT
Start: 2025-07-22 | End: 2025-07-23

## 2025-07-22 RX ORDER — PROMETHAZINE HYDROCHLORIDE 12.5 MG/1
12.5 SUPPOSITORY RECTAL EVERY 6 HOURS PRN
Status: DISCONTINUED | OUTPATIENT
Start: 2025-07-22 | End: 2025-07-23

## 2025-07-22 RX ORDER — PENICILLIN G 3000000 [IU]/50ML
3 INJECTION, SOLUTION INTRAVENOUS EVERY 4 HOURS
Status: DISCONTINUED | OUTPATIENT
Start: 2025-07-22 | End: 2025-07-22

## 2025-07-22 RX ORDER — MAGNESIUM CARB/ALUMINUM HYDROX 105-160MG
30 TABLET,CHEWABLE ORAL ONCE
Status: DISCONTINUED | OUTPATIENT
Start: 2025-07-22 | End: 2025-07-23

## 2025-07-22 RX ORDER — ROPIVACAINE HYDROCHLORIDE 5 MG/ML
INJECTION, SOLUTION EPIDURAL; INFILTRATION; PERINEURAL AS NEEDED
Status: DISCONTINUED | OUTPATIENT
Start: 2025-07-22 | End: 2025-07-23 | Stop reason: SURG

## 2025-07-22 RX ORDER — ROPIVACAINE HYDROCHLORIDE 2 MG/ML
15 INJECTION, SOLUTION EPIDURAL; INFILTRATION; PERINEURAL CONTINUOUS
Status: DISCONTINUED | OUTPATIENT
Start: 2025-07-22 | End: 2025-07-25 | Stop reason: HOSPADM

## 2025-07-22 RX ORDER — DIPHENHYDRAMINE HYDROCHLORIDE 50 MG/ML
12.5 INJECTION, SOLUTION INTRAMUSCULAR; INTRAVENOUS EVERY 8 HOURS PRN
Status: DISCONTINUED | OUTPATIENT
Start: 2025-07-22 | End: 2025-07-23

## 2025-07-22 RX ORDER — FAMOTIDINE 10 MG/ML
20 INJECTION, SOLUTION INTRAVENOUS ONCE AS NEEDED
Status: COMPLETED | OUTPATIENT
Start: 2025-07-22 | End: 2025-07-22

## 2025-07-22 RX ORDER — EPHEDRINE SULFATE 5 MG/ML
10 INJECTION INTRAVENOUS
Status: DISCONTINUED | OUTPATIENT
Start: 2025-07-22 | End: 2025-07-23

## 2025-07-22 RX ORDER — PROMETHAZINE HYDROCHLORIDE 12.5 MG/1
12.5 TABLET ORAL EVERY 6 HOURS PRN
Status: DISCONTINUED | OUTPATIENT
Start: 2025-07-22 | End: 2025-07-23

## 2025-07-22 RX ORDER — ONDANSETRON 2 MG/ML
4 INJECTION INTRAMUSCULAR; INTRAVENOUS ONCE AS NEEDED
Status: COMPLETED | OUTPATIENT
Start: 2025-07-22 | End: 2025-07-22

## 2025-07-22 RX ADMIN — FENTANYL CITRATE 100 MCG: 50 INJECTION, SOLUTION INTRAMUSCULAR; INTRAVENOUS at 22:59

## 2025-07-22 RX ADMIN — LABETALOL HYDROCHLORIDE 20 MG: 5 INJECTION INTRAVENOUS at 18:45

## 2025-07-22 RX ADMIN — Medication 2 MILLI-UNITS/MIN: at 18:06

## 2025-07-22 RX ADMIN — MAGNESIUM SULFATE HEPTAHYDRATE 4 G: 40 INJECTION, SOLUTION INTRAVENOUS at 16:30

## 2025-07-22 RX ADMIN — SODIUM CHLORIDE 2000 MG: 900 INJECTION INTRAVENOUS at 18:34

## 2025-07-22 RX ADMIN — ROPIVACAINE HYDROCHLORIDE 7 ML: 5 INJECTION, SOLUTION EPIDURAL; INFILTRATION; PERINEURAL at 22:59

## 2025-07-22 RX ADMIN — ONDANSETRON 4 MG: 2 INJECTION, SOLUTION INTRAMUSCULAR; INTRAVENOUS at 23:52

## 2025-07-22 RX ADMIN — LIDOCAINE HYDROCHLORIDE AND EPINEPHRINE 3 ML: 15; 5 INJECTION, SOLUTION EPIDURAL at 22:54

## 2025-07-22 RX ADMIN — BUTORPHANOL TARTRATE 2 MG: 2 INJECTION, SOLUTION INTRAMUSCULAR; INTRAVENOUS at 19:30

## 2025-07-22 RX ADMIN — LIDOCAINE HYDROCHLORIDE AND EPINEPHRINE 2 ML: 15; 5 INJECTION, SOLUTION EPIDURAL at 22:56

## 2025-07-22 RX ADMIN — ROPIVACAINE HYDROCHLORIDE 15 ML/HR: 2 INJECTION, SOLUTION EPIDURAL; INFILTRATION at 23:03

## 2025-07-22 RX ADMIN — FAMOTIDINE 20 MG: 10 INJECTION INTRAVENOUS at 22:41

## 2025-07-22 RX ADMIN — SODIUM CHLORIDE, POTASSIUM CHLORIDE, SODIUM LACTATE AND CALCIUM CHLORIDE 75 ML/HR: 600; 310; 30; 20 INJECTION, SOLUTION INTRAVENOUS at 16:30

## 2025-07-22 RX ADMIN — LABETALOL HYDROCHLORIDE 20 MG: 5 INJECTION, SOLUTION INTRAVENOUS at 18:45

## 2025-07-22 NOTE — PROCEDURES
30 y.o.  OB History          5    Para   3    Term   2       1    AB   1    Living   3         SAB   1    IAB        Ectopic        Molar        Multiple        Live Births   1             Presents at 35 2/7 weeks as an induction of labour due to Severe pre-eclampsia   Her primary OB requests a Gold Bulb placement to initiate the induction of labour.    Fetal Heart Rate Assessment   Method: Fetal HR Assessment Method: external   Beats/min: Fetal HR (beats/min): 125   Baseline: Fetal HR Baseline: normal range   Variability: Fetal HR Variability: moderate (amplitude range 6 to 25 bpm)   Accels: Fetal HR Accelerations: greater than/equal to 15 bpm, lasting at least 15 seconds   Decels: Fetal HR Decelerations: absent   Tracing Category:       TOCO:  Irregular   SVE:  60/-2    A Gold Bulb was placed without difficulties with 60 mL of sterile saline.  The patient tolerated the procedure well.

## 2025-07-23 LAB
ALP SERPL-CCNC: 122 U/L (ref 39–117)
ALT SERPL W P-5'-P-CCNC: 10 U/L (ref 1–33)
AST SERPL-CCNC: 25 U/L (ref 1–32)
BILIRUB SERPL-MCNC: <0.2 MG/DL (ref 0–1.2)
CREAT SERPL-MCNC: 0.96 MG/DL (ref 0.57–1)
GLUCOSE BLDC GLUCOMTR-MCNC: 92 MG/DL (ref 70–130)
GLUCOSE BLDC GLUCOMTR-MCNC: 94 MG/DL (ref 70–130)
LDH SERPL-CCNC: 230 U/L (ref 135–214)
TREPONEMA PALLIDUM IGG+IGM AB [PRESENCE] IN SERUM OR PLASMA BY IMMUNOASSAY: NORMAL
URATE SERPL-MCNC: 5 MG/DL (ref 2.4–5.7)

## 2025-07-23 PROCEDURE — 84075 ASSAY ALKALINE PHOSPHATASE: CPT | Performed by: ADVANCED PRACTICE MIDWIFE

## 2025-07-23 PROCEDURE — 84460 ALANINE AMINO (ALT) (SGPT): CPT | Performed by: ADVANCED PRACTICE MIDWIFE

## 2025-07-23 PROCEDURE — 25010000002 MAGNESIUM SULFATE 20 GM/500ML SOLUTION: Performed by: STUDENT IN AN ORGANIZED HEALTH CARE EDUCATION/TRAINING PROGRAM

## 2025-07-23 PROCEDURE — 25010000002 FAMOTIDINE 10 MG/ML SOLUTION: Performed by: ADVANCED PRACTICE MIDWIFE

## 2025-07-23 PROCEDURE — 82565 ASSAY OF CREATININE: CPT | Performed by: ADVANCED PRACTICE MIDWIFE

## 2025-07-23 PROCEDURE — 25010000002 BUTORPHANOL PER 1 MG: Performed by: OBSTETRICS & GYNECOLOGY

## 2025-07-23 PROCEDURE — 84550 ASSAY OF BLOOD/URIC ACID: CPT | Performed by: ADVANCED PRACTICE MIDWIFE

## 2025-07-23 PROCEDURE — 25810000003 LACTATED RINGERS PER 1000 ML: Performed by: ADVANCED PRACTICE MIDWIFE

## 2025-07-23 PROCEDURE — 51702 INSERT TEMP BLADDER CATH: CPT

## 2025-07-23 PROCEDURE — 84450 TRANSFERASE (AST) (SGOT): CPT | Performed by: ADVANCED PRACTICE MIDWIFE

## 2025-07-23 PROCEDURE — 25010000002 METOCLOPRAMIDE PER 10 MG: Performed by: ANESTHESIOLOGY

## 2025-07-23 PROCEDURE — 82247 BILIRUBIN TOTAL: CPT | Performed by: ADVANCED PRACTICE MIDWIFE

## 2025-07-23 PROCEDURE — 83615 LACTATE (LD) (LDH) ENZYME: CPT | Performed by: ADVANCED PRACTICE MIDWIFE

## 2025-07-23 PROCEDURE — 82948 REAGENT STRIP/BLOOD GLUCOSE: CPT

## 2025-07-23 PROCEDURE — C1755 CATHETER, INTRASPINAL: HCPCS

## 2025-07-23 PROCEDURE — 25010000002 CEFAZOLIN PER 500 MG: Performed by: ADVANCED PRACTICE MIDWIFE

## 2025-07-23 RX ORDER — CARBOPROST TROMETHAMINE 250 UG/ML
250 INJECTION, SOLUTION INTRAMUSCULAR AS NEEDED
Status: DISCONTINUED | OUTPATIENT
Start: 2025-07-23 | End: 2025-07-23

## 2025-07-23 RX ORDER — OXYTOCIN/0.9 % SODIUM CHLORIDE 30/500 ML
125 PLASTIC BAG, INJECTION (ML) INTRAVENOUS ONCE AS NEEDED
Status: DISCONTINUED | OUTPATIENT
Start: 2025-07-23 | End: 2025-07-25 | Stop reason: HOSPADM

## 2025-07-23 RX ORDER — ONDANSETRON 2 MG/ML
4 INJECTION INTRAMUSCULAR; INTRAVENOUS EVERY 6 HOURS PRN
Status: DISCONTINUED | OUTPATIENT
Start: 2025-07-23 | End: 2025-07-25 | Stop reason: HOSPADM

## 2025-07-23 RX ORDER — METHYLERGONOVINE MALEATE 0.2 MG/ML
200 INJECTION INTRAVENOUS ONCE AS NEEDED
Status: DISCONTINUED | OUTPATIENT
Start: 2025-07-23 | End: 2025-07-23

## 2025-07-23 RX ORDER — MISOPROSTOL 200 UG/1
TABLET ORAL
Status: COMPLETED
Start: 2025-07-23 | End: 2025-07-23

## 2025-07-23 RX ORDER — ONDANSETRON 2 MG/ML
4 INJECTION INTRAMUSCULAR; INTRAVENOUS EVERY 6 HOURS PRN
Status: DISCONTINUED | OUTPATIENT
Start: 2025-07-23 | End: 2025-07-23

## 2025-07-23 RX ORDER — ONDANSETRON 4 MG/1
4 TABLET, ORALLY DISINTEGRATING ORAL EVERY 6 HOURS PRN
Status: DISCONTINUED | OUTPATIENT
Start: 2025-07-23 | End: 2025-07-23

## 2025-07-23 RX ORDER — OXYTOCIN/0.9 % SODIUM CHLORIDE 30/500 ML
30 PLASTIC BAG, INJECTION (ML) INTRAVENOUS CONTINUOUS
Status: ACTIVE | OUTPATIENT
Start: 2025-07-23 | End: 2025-07-23

## 2025-07-23 RX ORDER — PRENATAL VIT/IRON FUM/FOLIC AC 27MG-0.8MG
1 TABLET ORAL DAILY
Status: DISCONTINUED | OUTPATIENT
Start: 2025-07-23 | End: 2025-07-25 | Stop reason: HOSPADM

## 2025-07-23 RX ORDER — BUPROPION HYDROCHLORIDE 150 MG/1
150 TABLET ORAL DAILY
Status: DISCONTINUED | OUTPATIENT
Start: 2025-07-23 | End: 2025-07-25 | Stop reason: HOSPADM

## 2025-07-23 RX ORDER — HYDROCODONE BITARTRATE AND ACETAMINOPHEN 10; 325 MG/1; MG/1
1 TABLET ORAL EVERY 4 HOURS PRN
Status: DISCONTINUED | OUTPATIENT
Start: 2025-07-23 | End: 2025-07-25 | Stop reason: HOSPADM

## 2025-07-23 RX ORDER — HYDROCORTISONE 25 MG/G
1 CREAM TOPICAL AS NEEDED
Status: DISCONTINUED | OUTPATIENT
Start: 2025-07-23 | End: 2025-07-25 | Stop reason: HOSPADM

## 2025-07-23 RX ORDER — BISACODYL 10 MG
10 SUPPOSITORY, RECTAL RECTAL DAILY PRN
Status: DISCONTINUED | OUTPATIENT
Start: 2025-07-24 | End: 2025-07-25 | Stop reason: HOSPADM

## 2025-07-23 RX ORDER — HYDROCODONE BITARTRATE AND ACETAMINOPHEN 5; 325 MG/1; MG/1
1 TABLET ORAL EVERY 4 HOURS PRN
Status: DISCONTINUED | OUTPATIENT
Start: 2025-07-23 | End: 2025-07-25 | Stop reason: HOSPADM

## 2025-07-23 RX ORDER — DOCUSATE SODIUM 100 MG/1
100 CAPSULE, LIQUID FILLED ORAL 2 TIMES DAILY
Status: DISCONTINUED | OUTPATIENT
Start: 2025-07-23 | End: 2025-07-25 | Stop reason: HOSPADM

## 2025-07-23 RX ORDER — MISOPROSTOL 200 UG/1
800 TABLET ORAL ONCE
Status: COMPLETED | OUTPATIENT
Start: 2025-07-23 | End: 2025-07-23

## 2025-07-23 RX ORDER — BUTORPHANOL TARTRATE 2 MG/ML
2 INJECTION, SOLUTION INTRAMUSCULAR; INTRAVENOUS ONCE
Status: COMPLETED | OUTPATIENT
Start: 2025-07-23 | End: 2025-07-23

## 2025-07-23 RX ORDER — IBUPROFEN 600 MG/1
600 TABLET, FILM COATED ORAL EVERY 6 HOURS PRN
Status: DISCONTINUED | OUTPATIENT
Start: 2025-07-23 | End: 2025-07-25 | Stop reason: HOSPADM

## 2025-07-23 RX ORDER — ACETAMINOPHEN 325 MG/1
650 TABLET ORAL EVERY 6 HOURS PRN
Status: DISCONTINUED | OUTPATIENT
Start: 2025-07-23 | End: 2025-07-25 | Stop reason: HOSPADM

## 2025-07-23 RX ORDER — SODIUM CHLORIDE 0.9 % (FLUSH) 0.9 %
1-10 SYRINGE (ML) INJECTION AS NEEDED
Status: DISCONTINUED | OUTPATIENT
Start: 2025-07-23 | End: 2025-07-25 | Stop reason: HOSPADM

## 2025-07-23 RX ORDER — LABETALOL 200 MG/1
200 TABLET, FILM COATED ORAL EVERY 12 HOURS SCHEDULED
Status: DISCONTINUED | OUTPATIENT
Start: 2025-07-23 | End: 2025-07-25 | Stop reason: HOSPADM

## 2025-07-23 RX ORDER — MISOPROSTOL 200 UG/1
800 TABLET ORAL AS NEEDED
Status: DISCONTINUED | OUTPATIENT
Start: 2025-07-23 | End: 2025-07-23

## 2025-07-23 RX ORDER — BUSPIRONE HYDROCHLORIDE 5 MG/1
5 TABLET ORAL 3 TIMES DAILY PRN
Status: DISCONTINUED | OUTPATIENT
Start: 2025-07-23 | End: 2025-07-25 | Stop reason: HOSPADM

## 2025-07-23 RX ORDER — OXYTOCIN/0.9 % SODIUM CHLORIDE 30/500 ML
30 PLASTIC BAG, INJECTION (ML) INTRAVENOUS ONCE
Status: COMPLETED | OUTPATIENT
Start: 2025-07-23 | End: 2025-07-23

## 2025-07-23 RX ORDER — METOCLOPRAMIDE HYDROCHLORIDE 5 MG/ML
10 INJECTION INTRAMUSCULAR; INTRAVENOUS EVERY 6 HOURS
Status: DISCONTINUED | OUTPATIENT
Start: 2025-07-23 | End: 2025-07-23

## 2025-07-23 RX ADMIN — MISOPROSTOL 800 MCG: 200 TABLET ORAL at 12:52

## 2025-07-23 RX ADMIN — CEFAZOLIN 1000 MG: 1 INJECTION, POWDER, FOR SOLUTION INTRAMUSCULAR; INTRAVENOUS at 02:38

## 2025-07-23 RX ADMIN — ACETAMINOPHEN 650 MG: 325 TABLET ORAL at 02:37

## 2025-07-23 RX ADMIN — BUSPIRONE HYDROCHLORIDE 5 MG: 5 TABLET ORAL at 13:23

## 2025-07-23 RX ADMIN — DOCUSATE SODIUM 100 MG: 100 CAPSULE, LIQUID FILLED ORAL at 08:29

## 2025-07-23 RX ADMIN — LABETALOL HYDROCHLORIDE 200 MG: 200 TABLET, FILM COATED ORAL at 20:38

## 2025-07-23 RX ADMIN — MAGNESIUM SULFATE HEPTAHYDRATE 2 G/HR: 40 INJECTION, SOLUTION INTRAVENOUS at 00:31

## 2025-07-23 RX ADMIN — Medication 30 UNITS: at 12:52

## 2025-07-23 RX ADMIN — BUTORPHANOL TARTRATE 2 MG: 2 INJECTION, SOLUTION INTRAMUSCULAR; INTRAVENOUS at 13:18

## 2025-07-23 RX ADMIN — PRENATAL VITAMINS-IRON FUMARATE 27 MG IRON-FOLIC ACID 0.8 MG TABLET 1 TABLET: at 08:29

## 2025-07-23 RX ADMIN — HYDROCODONE BITARTRATE AND ACETAMINOPHEN 1 TABLET: 10; 325 TABLET ORAL at 20:38

## 2025-07-23 RX ADMIN — METOCLOPRAMIDE 10 MG: 5 INJECTION, SOLUTION INTRAMUSCULAR; INTRAVENOUS at 03:28

## 2025-07-23 RX ADMIN — LABETALOL HYDROCHLORIDE 200 MG: 200 TABLET, FILM COATED ORAL at 08:29

## 2025-07-23 RX ADMIN — FAMOTIDINE 20 MG: 10 INJECTION INTRAVENOUS at 20:38

## 2025-07-23 RX ADMIN — Medication: at 08:53

## 2025-07-23 RX ADMIN — SODIUM CHLORIDE, POTASSIUM CHLORIDE, SODIUM LACTATE AND CALCIUM CHLORIDE 75 ML/HR: 600; 310; 30; 20 INJECTION, SOLUTION INTRAVENOUS at 13:25

## 2025-07-23 RX ADMIN — DOCUSATE SODIUM 100 MG: 100 CAPSULE, LIQUID FILLED ORAL at 20:38

## 2025-07-23 RX ADMIN — FAMOTIDINE 20 MG: 10 INJECTION INTRAVENOUS at 08:29

## 2025-07-23 RX ADMIN — BUPROPION HYDROCHLORIDE 150 MG: 150 TABLET, EXTENDED RELEASE ORAL at 08:29

## 2025-07-23 RX ADMIN — WITCH HAZEL: 500 SOLUTION RECTAL; TOPICAL at 08:53

## 2025-07-23 RX ADMIN — HYDROCODONE BITARTRATE AND ACETAMINOPHEN 1 TABLET: 10; 325 TABLET ORAL at 12:52

## 2025-07-23 RX ADMIN — SODIUM CHLORIDE, POTASSIUM CHLORIDE, SODIUM LACTATE AND CALCIUM CHLORIDE 100 ML/HR: 600; 310; 30; 20 INJECTION, SOLUTION INTRAVENOUS at 15:52

## 2025-07-23 RX ADMIN — IBUPROFEN 600 MG: 600 TABLET, FILM COATED ORAL at 08:29

## 2025-07-23 NOTE — PROGRESS NOTES
"07/22/25  23:22 EDT  Felecia Ashlie Mabson      ASSESSMENTS:  Just got epidural.  AROM about an hour ago for clear fluid.  Comfortable with epidural.  Did receive a dose of stadol before epidural.  Baby was reactive on admission but has remained with decreased variability s/p magnesium and stadol.  Occasional accel and no decels.  Encouraged to rest.    /75   Pulse 87   Temp 98.6 °F (37 °C) (Axillary)   Resp 17   Ht 170.2 cm (67\")   Wt 99.3 kg (219 lb)   SpO2 95%   BMI 34.30 kg/m²     Fetal Heart Rate Assessment   Method: Fetal HR Assessment Method: external   Beats/min: Fetal HR (beats/min): 120   Baseline: Fetal HR Baseline: normal range   Varibility: Fetal HR Variability: minimal (detectable, amplitude less than or equal to 5 bpm)   Accels: Fetal HR Accelerations: absent   Decels: Fetal HR Decelerations: absent   Tracing Category: Category 2     Uterine Assessment   Method: Method: external tocotransducer   Frequency (min): Contraction Frequency (Minutes): Occassional   Ctx Count in 10 min:     Duration:     Intensity:     Intensity by IUPC:     Resting Tone:     Resting Tone by IUPC:     [place] Units:                                Presentation: vertex   Cervix: Exam by: Method: sterile vaginal exam performed   Dilation: Cervical Dilation (cm): 6   Effacement: Cervical Effacement: 70   Station: Fetal Station: -2            Lab Results   Component Value Date    WBC 11.06 (H) 07/22/2025    HGB 10.9 (L) 07/22/2025    HCT 32.9 (L) 07/22/2025    MCV 91.4 07/22/2025     07/22/2025    POCGLU 116 07/22/2025    CREATININE 0.93 07/22/2025    URICACID 5.3 07/22/2025    AST 25 07/22/2025    ALT 9 07/22/2025     (H) 07/22/2025     Results from last 7 days   Lab Units 07/22/25  1631   ABO TYPING  O   RH TYPING  Positive   ANTIBODY SCREEN  Negative       PLAN:  Has not required anymore IV labetalol, BP now 137/75. Will watch closely and keep on 2 gms of magnesium.  Currently only n 8 mu/min of " Roxie Aden CNM  23:22 EDT  07/22/25

## 2025-07-23 NOTE — ANESTHESIA PROCEDURE NOTES
Labor Epidural      Patient reassessed immediately prior to procedure    Patient location during procedure: OB  Performed By  Anesthesiologist: Agnes Lopez DO  Preanesthetic Checklist  Completed: patient identified, IV checked, risks and benefits discussed, surgical consent, monitors and equipment checked, pre-op evaluation and timeout performed  Additional Notes  DPE performed using 25g Radha  Prep:  Pt Position:sitting  Sterile Tech:cap, gloves, mask and sterile barrier  Prep:chlorhexidine gluconate and isopropyl alcohol  Monitoring:blood pressure monitoring  Epidural Block Procedure:  Approach:midline  Guidance:palpation technique  Location:L3-L4  Needle Type:Tuohy  Needle Gauge:17 G  Loss of Resistance Medium: air  Loss of Resistance: 6cm  Cath Depth at skin:12 cm  Paresthesia: none  Aspiration:negative  Test Dose:negative  Number of Attempts: 1  Post Assessment:  Dressing:occlusive dressing applied and secured with tape  Pt Tolerance:patient tolerated the procedure well with no apparent complications  Complications:no

## 2025-07-23 NOTE — LACTATION NOTE
"   07/23/25 1445   Maternal Information   Date of Referral 07/23/25   Person Making Referral lactation consultant  (courtesy; newly postpartum)   Maternal Reason for Referral other (see comments)  (4th time mother; stated she nursed 1st child for over a year; 2nd child for 10 months; 3rd child for 8-9 months; would like to try to nurse current infant, but stated she has \"huge nipples\" and infant is small. in [facility] on Mag.)   Infant Reason for Referral 35-37 weeks gestation   Maternal Assessment   Breast Size Issue none   Breast Shape Bilateral:;round;other (see comments)  (larger)   Breast Density Bilateral:;soft   Nipples Bilateral:;everted;other (see comments)  (larger nipples)   Left Nipple Symptoms intact;nontender   Right Nipple Symptoms intact;nontender   Maternal Infant Feeding   Maternal Emotional State receptive   Infant Positioning clutch/football  (right; infant not able to latch onto breast; tried 24mm nipple shield, but too small for nipple area; infant also had been given formula prior to LC visit)   Latch Assistance verbal guidance offered;other (see comments)  (mother would like to try to nurse, but ok with formula and pumping)   Support Person Involvement verbally supports mother   Milk Expression/Equipment   Breast Pump Type double electric, personal;double electric, hospital grade  (provided RX Spectra pump from [employer] and hospital pump)   Breast Pump Flange Type hard   Breast Pump Flange Size 30 mm  (discussed flange sizing, encouraged tightest comfortable fit; looked to be appropriate)   Equipment for Home Use breast pump provided  (from [employer] supply)   Breast Pumping   Breast Pumping Interventions early pumping promoted;frequent pumping encouraged;post-feed pumping encouraged  (encouraged pumping around the clock for optimal milk production or when infant is provided formula; also due to infant gestational age and being on Mag; also provided Dr. Mills bottle)   Breast Pumping double " "electric breast pump utilized  (used 30mm flanges; colostrum noted in flanges)   Lactation Referrals   Lactation Referrals outpatient lactation program   Outpatient Lactation Program Lactation Follow-up Date/Time prn after discharge     Courtesy visit; newly postpartum.  Mother in APU on Mag.  Infant preemie.  Mother stated she would like to try to nurse current infant, but feels infant too small to latch on her \"huge nipples\".  Offered latch assistance, mother accepted.  Mother placed infant in L football hold, infant not interested in latching since infant already had formula.    Infant did open a bit, but not enough to grasp nipple or any breast tissue.  Mother has bilateral larger nipples, so used 24mm nipple shield, but would not fit on mother's nipples.  Encouraged pumping and encouraged mother that as infant builds her skill, infant can open wider to latch, it would just take practice.  Mother verbalized understanding.    Began mother pumping with 30mm flanges.  Colostrum noted in flanges.  Showed how to collect and syringe feed infant.  Encouraged pumping every three hours around the clock or when infant is given a bottle.  Encouraged pumping due to being on Mag and infant being a preemie to optimize milk production.  Answered questions.  Mother verbalized understanding.  To call lactation PRN.  "

## 2025-07-23 NOTE — PROGRESS NOTES
7/23/2025  PPD #0    Subjective   Felecia feels well.  Patient describes her lochia less than menses.  Pain is well controlled.  No headache, vision changes, right upper quadrant pain, epigastric pain.  No dizziness, chest pain, shortness of breath       Objective   Temp: Temp:  [97.5 °F (36.4 °C)-98.6 °F (37 °C)] 97.7 °F (36.5 °C) Temp src: Axillary   BP: BP: (0-164)/(0-101) 123/74        Pulse: Heart Rate:  [] 87  RR: Resp:  [16-20] 16    General:  No acute distress   Abdomen: Fundus firm and beneath umbilicus   Pelvis: deferred     Lab Results   Component Value Date    WBC 11.06 (H) 07/22/2025    HGB 10.9 (L) 07/22/2025    HCT 32.9 (L) 07/22/2025    MCV 91.4 07/22/2025     07/22/2025    URICACID 5.3 07/22/2025    AST 25 07/22/2025    ALT 9 07/22/2025    HEPBSAG Non-Reactive 01/30/2025       Assessment  PPD# 0 after vaginal delivery  Chronic hypertension with superimposed preeclampsia with severe features: On magnesium.  Blood pressures well-controlled on labetalol 200 mg p.o. twice daily    Plan  Routine postpartum cares  Continue magnesium for 24 hours postpartum  Continue labetalol  SCDs      This note has been electronically signed.    Katheryn Mcmanus MD  08:14 EDT  July 23, 2025

## 2025-07-23 NOTE — ANESTHESIA POSTPROCEDURE EVALUATION
Patient: Felecia Estrella    Procedure Summary       Date: 07/22/25 Room / Location:     Anesthesia Start: 2245 Anesthesia Stop: 07/23/25 0412    Procedure: LABOR ANALGESIA Diagnosis:     Scheduled Providers:  Provider: Agnes Lopez DO    Anesthesia Type: epidural ASA Status: 2            Anesthesia Type: epidural    Vitals  Vitals Value Taken Time   /97 07/23/25 14:11   Temp 97.7 °F (36.5 °C) 07/23/25 07:47   Pulse 78 07/23/25 14:11   Resp 16 07/23/25 12:33   SpO2 100 % 07/23/25 12:33   Vitals shown include unfiled device data.        Post Anesthesia Care and Evaluation    Patient location during evaluation: bedside  Patient participation: complete - patient participated  Level of consciousness: awake  Pain score: 0  Pain management: satisfactory to patient    Airway patency: patent  Anesthetic complications: No anesthetic complications  PONV Status: none  Cardiovascular status: acceptable and hemodynamically stable  Respiratory status: acceptable  Hydration status: acceptable  Post Neuraxial Block status: Motor and sensory function returned to baseline and No signs or symptoms of PDPH

## 2025-07-23 NOTE — PROGRESS NOTES
"07/23/25  01:16 EDT  Felecia Ashlie Mabson      ASSESSMENTS:  Called to evaluate patient due to she was not feeling well and states she was having some concerns over her bleeding.  Mag was decreased to 1.5 gms and then patient had large episode of emesis and when I arrived she said she felt better.  BP's have been 120'2/60-70 since epidural    /77   Pulse 82   Temp 97.5 °F (36.4 °C) (Axillary)   Resp 17   Ht 170.2 cm (67\")   Wt 99.3 kg (219 lb)   SpO2 93%   BMI 34.30 kg/m²     Fetal Heart Rate Assessment   Method: Fetal HR Assessment Method: external   Beats/min: Fetal HR (beats/min): 120   Baseline: Fetal HR Baseline: normal range   Varibility: Fetal HR Variability: minimal (detectable, amplitude less than or equal to 5 bpm)   Accels: Fetal HR Accelerations: absent   Decels: Fetal HR Decelerations: absent   Tracing Category: Category 2     Uterine Assessment   Method: Method: external tocotransducer   Frequency (min): Every 4 minutes   Ctx Count in 10 min:     Duration:  60   Intensity:  strong   Intensity by IUPC:     Resting Tone:     Resting Tone by IUPC:     [place] Units:                                Presentation: vertex   Cervix: Exam by: Method: sterile vaginal exam performed   Dilation: Cervical Dilation (cm): 6   Effacement: Cervical Effacement: 70   Station: Fetal Station: -2            Lab Results   Component Value Date    WBC 11.06 (H) 07/22/2025    HGB 10.9 (L) 07/22/2025    HCT 32.9 (L) 07/22/2025    MCV 91.4 07/22/2025     07/22/2025    POCGLU 92 07/23/2025    CREATININE 0.93 07/22/2025    URICACID 5.3 07/22/2025    AST 25 07/22/2025    ALT 9 07/22/2025     (H) 07/22/2025     Results from last 7 days   Lab Units 07/22/25  1631   ABO TYPING  O   RH TYPING  Positive   ANTIBODY SCREEN  Negative       PLAN: Dr. Vargas contacted and asked to review strip.  Strip reviewed and she agreed with plan.    Michelle Aden CNM  01:16 EDT  07/23/25  "

## 2025-07-23 NOTE — PAYOR COMM NOTE
"Felecia Brothers Ashlie (30 y.o. Female)     From:Elsy Barton LPN, Utilization Review  Phone #[phone]  Fax #[phone]    [employer] ID#[id number]     Delivery Information:  Vaginal Delivery 7/23/25  Wt 2430 gms  Female  Apgars 7/9          Date of Birth   1995    Social Security Number       Address   [address] [place] [address]    Home Phone   [phone]    MRN   [id number]       Mu-ism   None    Marital Status   Single                            Admission Date   7/22/2025    Admission Type   Emergency    Admitting Provider   Jospehine Vargas DO    Attending Provider   Michelle Aden CNM    Department, Room/Bed   [facility] ANTEPARTUM, N334/1       Discharge Date       Discharge Disposition       Discharge Destination                                 Attending Provider: Michelle Aden CNM    Allergies: Penicillins    Isolation: None   Infection: None   Code Status: CPR    Ht: 170.2 cm (67\")   Wt: 99.3 kg (219 lb)    Admission Cmt: None   Principal Problem: Preeclampsia, severe [O14.10]                   Active Insurance as of 7/22/2025       Primary Coverage       Payor Plan Insurance Group Employer/Plan Group    [employer] [place] [employer]ICA[employer]        Payor Plan Address Payor Plan Phone Number Payor Plan Fax Number Effective Dates    [address] [address] [phone]  4/1/2025 - None Entered    [facility] [id number]         Subscriber Name Subscriber Birth Date Member ID       FELECIA BROTHERS 1995 [id number]                     Emergency Contacts        (Rel.) Home Phone Work Phone Mobile Phone    LyleShin gonsalves (Significant Other) [phone] -- --              Insurance Information                  [facility] Phone: [phone]    Subscriber: Felecia Brothers Subscriber#: [id number]    Group#: -- Precert#: --    Authorization#: -- Effective Date: --             History & Physical        Michelle Aden CNM at " "25          [facility]  Obstetric History and Physical    Chief Complaint   Patient presents with    Elevated Blood Pressure       Subjective    Patient is a 30 y.o. female  currently at 35w2d, who presents from office with severe range BP's.  They were 159/100 and 160/104 with headache and 2+ protein.  States she has had BP's at home as high as 180/125 but \"took and extra BP pill when they got that high\".  D/W Dr. Vargas and decision made to admit and proceed with delivery and Magnesium Sulfate.. On intake denies  contractions, denies  leakage of fluid, denies  vaginal bleeding. reports fetal movement.    Her prenatal care is complicated by  hypertension  chronic hypertension with superimposed pre-eclampsia and gestational diabetes dx recently as she did not do her test until 34 weeks because she \"forgot\". She was initially on 100 mg Labetalol twice a day and this was increased to 200 mg twice a day. Her previous obstetric/gynecological history is noted for is remarkable for early deliveries x 3 due to blood pressure issues. She ended up on BP meds after last delivery and has not been able to be off of them.      The following portions of the patients history were reviewed and updated as appropriate: current medications, allergies, past medical history, past surgical history, past family history, past social history, and problem list .       Prenatal Information:  Prenatal Results       Initial Prenatal Labs       Test Value Reference Range Date Time    Hemoglobin  10.9 g/dL 12.0 - 15.9 25       11.2 g/dL 12.0 - 15.9 25 0002       12.6 g/dL 12.0 - 15.9 25 1537      ^ 13.4 g/dL 11.2 - 15.7 25 0139    Hematocrit  31.7 % 34.0 - 46.6 25 0304       33.1 % 34.0 - 46.6 25 0002       37.7 % 34.0 - 46.6 25 1537      ^ 38.9 % 34.0 - 45.0 25 013    Platelets  238 10*3/mm3 140 - 450 25 0304       237 10*3/mm3 140 - 450 25 0002       307 " 10*3/mm3 140 - 450 01/30/25 1537      ^ 317 10*3/uL 155 - 369 01/14/25 0139    Rubella IgG  6.06 index Immune >0.99 01/30/25 1537    Hepatitis B SAg  Non-Reactive  Non-Reactive 01/30/25 1537    Hepatitis C Ab  Non-Reactive  Non-Reactive 01/30/25 1537    RPR        T. Pallidum Ab   Non-Reactive  Non-Reactive 06/20/25 1158       Non-Reactive  Non-Reactive 01/30/25 1537    ABO  O   07/22/25 1631    Rh  Positive   07/22/25 1631    Antibody Screen  Negative   04/14/25 1031       Negative   01/30/25 1537    HIV  Non-Reactive  Non-Reactive 01/30/25 1537    Urine Culture  <10,000 CFU/mL Normal Urogenital Ev   06/20/25 1050    Gonorrhea        Chlamydia        TSH        HgB A1c         Varicella IgG  Reactive  Non Reactive 01/30/25 1537    Hemoglobinopathy Fractionation        Hemoglobinopathy (genetic testing)        Cystic fibrosis         Spinal muscular atrophy        Fragile X                  Fetal testing        Test Value Reference Range Date Time    NIPT        MSAFP        AFP-4                  2nd and 3rd Trimester       Test Value Reference Range Date Time    Hemoglobin (repeated)  10.9 g/dL 12.0 - 15.9 07/22/25 1631       10.3 g/dL 12.0 - 15.9 07/10/25 0044       11.9 g/dL 12.0 - 15.9 06/20/25 1158    Hematocrit (repeated)  32.9 % 34.0 - 46.6 07/22/25 1631       31.3 % 34.0 - 46.6 07/10/25 0044       36.2 % 34.0 - 46.6 06/20/25 1158    Platelets   224 10*3/mm3 140 - 450 07/22/25 1631       213 10*3/mm3 140 - 450 07/10/25 0044       238 10*3/mm3 140 - 450 06/20/25 1158       238 10*3/mm3 140 - 450 04/14/25 0304       237 10*3/mm3 140 - 450 04/12/25 0002       307 10*3/mm3 140 - 450 01/30/25 1537      ^ 317 10*3/uL 155 - 369 01/14/25 0139    1 hour GTT   186 mg/dL 74 - 180 07/12/25 0808       157 mg/dL 65 - 139 06/20/25 1158    Antibody Screen (repeated)  Negative   07/22/25 1631       Negative   06/20/25 1158    3rd TM syphilis scrn (repeated)  RPR         3rd TM syphilis scrn (repeated) TP-Ab  Non-Reactive   Non-Reactive 25 1158    3rd TM syphilis screen TB-Ab (FTA)  Non-Reactive  Non-Reactive 25 1158    Syphilis cascade test TP-Ab (EIA)        Syphilis cascade TPPA        GTT Fasting        GTT 1 Hr        GTT 2 Hr        GTT 3 Hr  126 mg/dL 74 - 140 25 0808    Group B Strep                  Other testing        Test Value Reference Range Date Time    Parvo IgG         CMV IgG                   Drug Screening       Test Value Reference Range Date Time    Amphetamine Screen        Barbiturate Screen        Benzodiazepine Screen        Methadone Screen        Phencyclidine Screen        Opiates Screen        THC Screen        Cocaine Screen        Propoxyphene Screen        Buprenorphine Screen        Methamphetamine Screen        Oxycodone Screen        Tricyclic Antidepressants Screen                  Legend    ^: Historical                               Past OB History:       OB History    Para Term  AB Living   5 3 2 1 1 3   SAB IAB Ectopic Molar Multiple Live Births   1 0 0 0 0 1      # Outcome Date GA Lbr Noé/2nd Weight Sex Type Anes PTL Lv   5 Current            4 Term 18 37w0d   F Vag-Spont      3 Term 16 38w0d   M Vag-Spont      2 SAB            1  09 32w0d   F Vag-Spont  Y AZ       Past Medical History: Past Medical History:   Diagnosis Date    Anxiety     Chronic hypertension     Kidney infection     Kidney stone       Past Surgical History Past Surgical History:   Procedure Laterality Date    D & C WITH SUCTION        Family History: Family History   Problem Relation Age of Onset    Hypertension Mother     Stroke Mother     Diabetes Maternal Grandmother       Social History:  reports that she has quit smoking. Her smoking use included cigarettes. She has a 1.5 pack-year smoking history. She has never used smokeless tobacco.   reports that she does not currently use alcohol.   reports no history of drug use.        REVIEW OF SYSTEMS               Reports fetal movement is normal             Denies leakage of amniotic fluid.             Denies vaginal bleeding             She reports No contractions             All other systems reviewed and are negative    Objective      Vital Signs Range for the last 24 hours  Temperature: Temp:  [98.1 °F (36.7 °C)-98.6 °F (37 °C)] 98.6 °F (37 °C)   Temp Source: Temp src: Axillary   BP: BP: (0-164)/(0-101) 153/101   Pulse: Heart Rate:  [] 89   Respirations: Resp:  [19-20] 19   SPO2: SpO2:  [99 %] 99 %   O2 Amount (l/min):     O2 Devices     Weight: Weight:  [99.3 kg (219 lb)] 99.3 kg (219 lb)       Presentation: vertex   Cervix: Exam by: Method: sterile vaginal exam performed   Dilation: Cervical Dilation (cm): 2-3   Effacement: Cervical Effacement: 70   Station: Fetal Station: -2        Fetal Heart Rate Assessment   Method: Fetal HR Assessment Method: external   Beats/min: Fetal HR (beats/min): 125   Baseline: Fetal HR Baseline: normal range   Varibility: Fetal HR Variability: moderate (amplitude range 6 to 25 bpm)   Accels: Fetal HR Accelerations: greater than/equal to 15 bpm, lasting at least 15 seconds   Decels: Fetal HR Decelerations: absent   Tracing Category:  1     Uterine Assessment   Method: Method: external tocotransducer   Frequency (min): Contraction Frequency (Minutes): 2-3   Ctx Count in 10 min:     Duration:     Intensity:     Intensity by IUPC:     Resting Tone:     Resting Tone by IUPC:     [place] Units:         Constitutional:  Well developed, well nourished, no acute distress, well-groomed.   Respiratory:  Lungs are clear to auscultation bilaterally, normal breath sounds.   Cardiovascular:  Normal rate and rhythm, no murmurs.   Gastrointestinal:  Soft, gravid, nontender.  Uterus: Soft, nontender. Fundus appropriate for dates.  Neurologic:  Alert & oriented x 3,  no focal deficits noted.   Psychiatric:  Speech and behavior appropriate.   Extremities: no cyanosis, clubbing or edema, no evidence  of DVT.        Labs:  Lab Results   Component Value Date    WBC 11.06 (H) 07/22/2025    HGB 10.9 (L) 07/22/2025    HCT 32.9 (L) 07/22/2025    MCV 91.4 07/22/2025     07/22/2025    POCGLU 116 07/22/2025    CREATININE 0.93 07/22/2025    URICACID 5.3 07/22/2025    AST 25 07/22/2025    ALT 9 07/22/2025     (H) 07/22/2025     Results from last 7 days   Lab Units 07/22/25  1631   ABO TYPING  O   RH TYPING  Positive   ANTIBODY SCREEN  Negative           Assessment & Plan      Preeclampsia, severe  Balloon was placed on arrival along with PEP labs that were normal.  She was also started on magnesium 4 gm bolus and then 2 gm/ hr on arrival.  She has received on dose of IV Labetalol and just recently got Stadol.  Was feeling a lot of contractions and SVE now changed from FT on admission to 2-3 with balloon in.  Still having a headache.      Assessment:   IUP at 35w2d weeks gestation with reactive fetal status.    2.   induction of labor  for severe preeclampsia  with unfavorable cervix  3.   Obstetrical history significant for is remarkable for gestational hypertension in prior pregnancies, and was unable to get off BP medication after last delivery  4.   GBS status: unknown so antibiotics started on admission  5.   Rh: positive    Plan:  Oxytocin induction, Oxytocin augmentation, Gold bulb for cervical ripening, Magnesium sulfate seizure prophylaxis, and Antibiotics for GBS prophylaxis  Continuous FHT and TOCO monitoring.   Diet: NPO  Desires epidural for anesthesia.   Plan of care has been reviewed with patient and questions answered.  Risks, benefits of treatment plan have been discussed.   Consent obtained to proceed with labor management and subsequent delivery of baby.        Michelle Aden CNM  7/22/2025  20:17 EDT    Electronically signed by Michelle Aden CNM at 07/22/25 2030       Facility-Administered Medications as of 7/23/2025   Medication Dose Route Frequency Provider Last Rate Last Admin     acetaminophen (TYLENOL) tablet 650 mg  650 mg Oral Q6H PRN Michelle Aden CNM        benzocaine (AMERICAINE) 20 % rectal ointment 1 Application  1 Application Rectal PRN Michelle Aden CNM        benzocaine-menthol (DERMOPLAST) 20-0.5 % topical spray   Topical PRN Michelle Aden CNM        [START ON 7/24/2025] bisacodyl (DULCOLAX) suppository 10 mg  10 mg Rectal Daily PRN Michelle Aden CNM        calcium gluconate injection 1 g  1 g Intravenous PRN Josephine Vargas DO        [COMPLETED] ceFAZolin 2000 mg IVPB in 100 mL NS (MBP)  2,000 mg Intravenous Once Michelle Aden CNM   2,000 mg at 07/22/25 1834    docusate sodium (COLACE) capsule 100 mg  100 mg Oral BID Michelle Aden CNM        ePHEDrine Sulfate (Pressors) 5 MG/ML injection             famotidine (PEPCID) injection 20 mg  20 mg Intravenous Q12H Michelle Aden CNM        [COMPLETED] famotidine (PEPCID) injection 20 mg  20 mg Intravenous Once PRN Agnes Lopez DO   20 mg at 07/22/25 2241    HYDROcodone-acetaminophen (NORCO) 5-325 MG per tablet 1 tablet  1 tablet Oral Q4H PRN Michelle Aden CNM        Or    HYDROcodone-acetaminophen (NORCO)  MG per tablet 1 tablet  1 tablet Oral Q4H PRN Michelle Aden CNM        Hydrocortisone (Perianal) (ANUSOL-HC) 2.5 % rectal cream 1 Application  1 Application Rectal PRN Michelle Aden CNM        ibuprofen (ADVIL,MOTRIN) tablet 600 mg  600 mg Oral Q6H PRN Michelle Aden CNM        labetalol (NORMODYNE) tablet 200 mg  200 mg Oral Q12H Michelle Aden CNM        labetalol (NORMODYNE,TRANDATE) injection 20-80 mg  20-80 mg Intravenous Q10 Min PRN Michelle Aden CNM   20 mg at 07/22/25 1845    lactated ringers infusion  75 mL/hr Intravenous Continuous Michelle Aden CNM 75 mL/hr at 07/22/25 1630 75 mL/hr at 07/22/25 1630    lanolin topical 1 Application  1 Application Topical Q1H PRN Michelle Aden CNM        magnesium hydroxide (MILK OF MAGNESIA) suspension 10 mL   10 mL Oral Daily PRN Michelle Aden CNM        magnesium sulfate 20 GM/500ML infusion  2 g/hr Intravenous Continuous [place], Josephine C, DO 50 mL/hr at 25 0412 2 g/hr at 25 0412    [COMPLETED] magnesium sulfate bolus from bag 0.04 g/mL solution 4 g  4 g Intravenous Once [place], Josephine C,  mL/hr at 25 1630 4 g at 25 1630    [COMPLETED] metoclopramide (REGLAN) injection 10 mg  10 mg Intravenous Once PRN Karely Mani Agnes A, DO   10 mg at 25 0328    metoclopramide (REGLAN) injection 10 mg  10 mg Intravenous Q6H Michelle Aden CNM        [COMPLETED] ondansetron (ZOFRAN) injection 4 mg  4 mg Intravenous Once PRN Karely Mani, Agnes A, DO   4 mg at 25 2352    ondansetron (ZOFRAN) injection 4 mg  4 mg Intravenous Q6H PRN Michelle Aden CNM        oxytocin (PITOCIN) 30 units in 0.9% sodium chloride 500 mL (premix)  30 Units Intravenous Once [place], Josephine C, DO        Followed by    [] oxytocin (PITOCIN) 30 units in 0.9% sodium chloride 500 mL (premix)  30 Units Intravenous Continuous Sam, Josephine C,  mL/hr at 25 0429 Rate Change at 25 0429    oxytocin (PITOCIN) 30 units in 0.9% sodium chloride 500 mL (premix)  2-20 dar-units/min Intravenous Titrated Michelle Aden CNM   Stopped at 25 0358    oxytocin (PITOCIN) 30 units in 0.9% sodium chloride 500 mL (premix)  125 mL/hr Intravenous Once PRN Michelle Aden CNM        prenatal vitamin tablet 1 tablet  1 tablet Oral Daily Michelle Aden CNM        ropivacaine (NAROPIN) 0.2 % injection  15 mL/hr Epidural Continuous Karely Mani Agnes A, DO   Stopped at 25 0416    sodium chloride 0.9 % bolus 300 mL  300 mL Intrauterine Once Michelle Aden CNM        sodium chloride 0.9 % flush 1-10 mL  1-10 mL Intravenous PRN Michelle Aden CNM        witch hazel-glycerin (TUCKS) pad   Topical PRN Michelle Aden CNM         Orders (all)        Start     Ordered     07/24/25 0800  Sitz Bath  3 Times Daily        Comments: PRN    07/23/25 0637    07/24/25 0600  CBC & Differential  Timed        Comments: Postpartum Day 1      07/23/25 0637    07/24/25 0000  bisacodyl (DULCOLAX) suppository 10 mg  Daily PRN         07/23/25 0637    07/23/25 0900  prenatal vitamin tablet 1 tablet  Daily         07/23/25 0637    07/23/25 0900  labetalol (NORMODYNE) tablet 200 mg  Every 12 Hours Scheduled         07/23/25 0637    07/23/25 0900  docusate sodium (COLACE) capsule 100 mg  2 Times Daily         07/23/25 0637    07/23/25 0638  Transfer Patient  Once         07/23/25 0637    07/23/25 0638  Code Status and Medical Interventions: CPR (Attempt to Resuscitate); Full  Continuous         07/23/25 0637    07/23/25 0638  Vital Signs Per hospital policy  Per Hospital Policy/Protocol         07/23/25 0637    07/23/25 0638  Notify Provider  Continuous        Comments: Open Order Report to View Parameters Requiring Provider Notification    07/23/25 0637    07/23/25 0638  Up Ad Maria Luz  Until Discontinued         07/23/25 0637    07/23/25 0638  Ambulate Patient  Every Shift       07/23/25 0637    07/23/25 0638  Diet: Regular/House; Fluid Consistency: Thin (IDDSI 0)  Diet Effective Now         07/23/25 0637    07/23/25 0638  Advance Diet As Tolerated -Regular  Until Discontinued         07/23/25 0637    07/23/25 0638  Fundal and Lochia Check  Per Hospital Policy/Protocol        Comments: Q 15 min x 4, Q 30 min x 2, then Q Shift    07/23/25 0637    07/23/25 0638  RN to Assess Rh Status & Place RhIG Evaluation Order if Indicated  Continuous         07/23/25 0637    07/23/25 0638  Bladder Assessment  Per Order Details        Comments: Postpartum 1) Upon Admission to Unit & Every 4 Hours PRN Until Voiding. 2) Out of Bed to Void in 8 Hours.    07/23/25 0637    07/23/25 0638  Straight Cath  Per Order Details        Comments: Postpartum: If Distended & Unable to Void, May Repeat Once.    07/23/25 0637    07/23/25  "0638  Indwelling Urinary Catheter  Per Order Details        Comments: Postpartum : After Straight Cathed x2 or if Greater Than 1000mL Residual, Insert Indwelling Urinary Catheter Until Further MD Order.    07/23/25 0637    07/23/25 0638  Breast pump to bed  Once         07/23/25 0637    07/23/25 0638  If indicated -- Please administer RH Immunoglobulin based on results of cord blood evaluation and fetal screen lab tests, pharmacy to dispense  Per Order Details        Comments: See Process Instructions For Reference Range Details.    07/23/25 0637    07/23/25 0638  Place Sequential Compression Device  Once         07/23/25 0637    07/23/25 0638  Maintain Sequential Compression Device  Continuous         07/23/25 0637    07/23/25 0638  Preeclampsia Panel  Morning Draw         07/23/25 0637    07/23/25 0637  HYDROcodone-acetaminophen (NORCO) 5-325 MG per tablet 1 tablet  Every 4 Hours PRN        Placed in \"Or\" Linked Group    07/23/25 0637    07/23/25 0637  HYDROcodone-acetaminophen (NORCO)  MG per tablet 1 tablet  Every 4 Hours PRN        Placed in \"Or\" Linked Group    07/23/25 0637    07/23/25 0637  sodium chloride 0.9 % flush 1-10 mL  As Needed         07/23/25 0637    07/23/25 0637  oxytocin (PITOCIN) 30 units in 0.9% sodium chloride 500 mL (premix)  Once As Needed         07/23/25 0637    07/23/25 0637  ibuprofen (ADVIL,MOTRIN) tablet 600 mg  Every 6 Hours PRN         07/23/25 0637    07/23/25 0637  acetaminophen (TYLENOL) tablet 650 mg  Every 6 Hours PRN         07/23/25 0637    07/23/25 0637  magnesium hydroxide (MILK OF MAGNESIA) suspension 10 mL  Daily PRN         07/23/25 0637    07/23/25 0637  lanolin topical 1 Application  Every 1 Hour PRN         07/23/25 0637    07/23/25 0637  benzocaine-menthol (DERMOPLAST) 20-0.5 % topical spray  As Needed         07/23/25 0637    07/23/25 0637  witch hazel-glycerin (TUCKS) pad  As Needed         07/23/25 0637    07/23/25 0637  Hydrocortisone (Perianal) " "(ANUSOL-HC) 2.5 % rectal cream 1 Application  As Needed         07/23/25 0637    07/23/25 0637  benzocaine (AMERICAINE) 20 % rectal ointment 1 Application  As Needed         07/23/25 0637    07/23/25 0637  ondansetron (ZOFRAN) injection 4 mg  Every 6 Hours PRN         07/23/25 0637    07/23/25 0530  oxytocin (PITOCIN) 30 units in 0.9% sodium chloride 500 mL (premix)  Continuous        Placed in \"Followed by\" Linked Group    07/23/25 0416    07/23/25 0515  oxytocin (PITOCIN) 30 units in 0.9% sodium chloride 500 mL (premix)  Once        Placed in \"Followed by\" Linked Group    07/23/25 0416    07/23/25 0430  sodium chloride 0.9 % bolus 300 mL  Once         07/23/25 0336    07/23/25 0417  Notify Physician (specified)  Until Discontinued,   Status:  Canceled         07/23/25 0416    07/23/25 0417  Vital Signs Per hospital policy  Per Hospital Policy/Protocol,   Status:  Canceled         07/23/25 0416    07/23/25 0417  Fundal & Lochia Check  Per Order Details,   Status:  Canceled        Comments: Every 15 Minutes x4, Then Every 30 Minutes x2, Then Every Shift    07/23/25 0416    07/23/25 0417  Diet: Regular/House; Fluid Consistency: Thin (IDDSI 0)  Diet Effective Now,   Status:  Canceled         07/23/25 0416    07/23/25 0417  Blood Gas, Arterial, Cord  Once        Comments: If requested by provider at the time of delivery      07/23/25 0416    07/23/25 0417  Nurse may remove epidural catheter after delivery.  Until Discontinued,   Status:  Canceled         07/23/25 0416    07/23/25 0417  Transfer to postpartum when discharge criteria met.  Until Discontinued,   Status:  Canceled         07/23/25 0416    07/23/25 0416  Up with Assistance  As Needed,   Status:  Canceled       07/23/25 0416    07/23/25 0416  Fundal & Lochia Check  Every Shift,   Status:  Canceled       07/23/25 0416    07/23/25 0416  Apply Ice to Perineum  As Needed,   Status:  Canceled       07/23/25 0416    07/23/25 0416  Bladder Assessment  As Needed,   " "Status:  Canceled       07/23/25 0416    07/23/25 0416  methylergonovine (METHERGINE) injection 200 mcg  Once As Needed,   Status:  Discontinued         07/23/25 0416    07/23/25 0416  carboprost (HEMABATE) injection 250 mcg  As Needed,   Status:  Discontinued         07/23/25 0416    07/23/25 0416  miSOPROStol (CYTOTEC) tablet 800 mcg  As Needed,   Status:  Discontinued         07/23/25 0416    07/23/25 0416  ondansetron ODT (ZOFRAN-ODT) disintegrating tablet 4 mg  Every 6 Hours PRN,   Status:  Discontinued        Placed in \"Or\" Linked Group    07/23/25 0416    07/23/25 0416  ondansetron (ZOFRAN) injection 4 mg  Every 6 Hours PRN,   Status:  Discontinued        Placed in \"Or\" Linked Group    07/23/25 0416    07/23/25 0400  metoclopramide (REGLAN) injection 10 mg  Every 6 Hours         07/23/25 0314    07/23/25 0336  Amnioinfusion Through IUPC  Once         07/23/25 0336    07/23/25 0300  ceFAZolin 1000 mg IVPB in 100 mL NS (MBP)  Every 8 Hours,   Status:  Discontinued        Placed in \"Followed by\" Linked Group    07/22/25 1809    07/23/25 0238  POC Glucose Once  PROCEDURE ONCE        Comments: Complete no more than 45 minutes prior to patient eating      07/23/25 0236    07/23/25 0012  POC Glucose Once  PROCEDURE ONCE        Comments: Complete no more than 45 minutes prior to patient eating      07/23/25 0010    07/22/25 2330  ropivacaine (NAROPIN) 0.2 % injection  Continuous         07/22/25 2233 07/22/25 2330  Sod Citrate-Citric Acid (BICITRA) oral solution 30 mL  Once,   Status:  Discontinued         07/22/25 2233 07/22/25 2300  famotidine (PEPCID) injection 20 mg  Every 12 Hours Scheduled         07/22/25 2211 07/22/25 2234  Vital Signs Per Anesthesia Guidelines  Per Order Details,   Status:  Canceled        Comments: Every 3 Minutes x20 Minutes Following Epidural Dosing, Then Every 15 Minutes If Stable    07/22/25 2233 07/22/25 2234  Start IV with #16 or #18 gauge angiocath.  Once         07/22/25 " "2233 07/22/25 2234  Nurse or anesthesiologist to remain with patient for 15 minutes following dosing.  Until Discontinued,   Status:  Canceled         07/22/25 2233 07/22/25 2234  Facilitate maternal postion on side and maintain uterine displacement.  Until Discontinued,   Status:  Canceled         07/22/25 2233 07/22/25 2234  Consult anesthesia services prior to changing epidural infusion/rate.  Until Discontinued,   Status:  Canceled         07/22/25 2233 07/22/25 2233  lactated ringers bolus 1,000 mL  As Needed,   Status:  Discontinued         07/22/25 2233 07/22/25 2233  ePHEDrine Sulfate (Pressors) 5 MG/ML injection 10 mg  Every 10 Minutes PRN,   Status:  Discontinued         07/22/25 2233 07/22/25 2233  metoclopramide (REGLAN) injection 10 mg  Once As Needed         07/22/25 2233 07/22/25 2233  ondansetron (ZOFRAN) injection 4 mg  Once As Needed         07/22/25 2233 07/22/25 2233  famotidine (PEPCID) injection 20 mg  Once As Needed         07/22/25 2233 07/22/25 2233  diphenhydrAMINE (BENADRYL) injection 12.5 mg  Every 8 Hours PRN,   Status:  Discontinued         07/22/25 2233 07/22/25 2229  ePHEDrine Sulfate (Pressors) 5 MG/ML injection        Note to Pharmacy: Created by cabinet override    07/22/25 2229 07/22/25 2100  sodium chloride 0.9 % flush 10 mL  Every 12 Hours Scheduled,   Status:  Discontinued         07/22/25 1612 07/22/25 2054  penicillin G in iso-osmotic dextrose IVPB 3 million units (premix)  Every 4 Hours,   Status:  Discontinued        Placed in \"Followed by\" Linked Group    07/22/25 1612 07/22/25 2000  Vital Signs q 4 while awake  Every 4 Hours,   Status:  Canceled      Comments: While the patient is awake.    07/22/25 1612 07/22/25 2000  POC Protein, Urine, Qualitative, Dipstick  Every 4 Hours,   Status:  Canceled      Comments: Until Urine Protein is Negative      07/22/25 1612 07/22/25 1900  ceFAZolin 2000 mg IVPB in 100 mL NS (MBP)  Once      " "  Placed in \"Followed by\" Linked Group    07/22/25 1809    07/22/25 1839  labetalol (NORMODYNE,TRANDATE) injection 20-80 mg  Every 10 Minutes PRN         07/22/25 1839    07/22/25 1838  POC Glucose Once  PROCEDURE ONCE        Comments: Complete no more than 45 minutes prior to patient eating      07/22/25 1836    07/22/25 1830  oxytocin (PITOCIN) 30 units in 0.9% sodium chloride 500 mL (premix)  Titrated         07/22/25 1735    07/22/25 1815  vancomycin IVPB 2000 mg in 0.9% Sodium Chloride 500 mL  Every 12 Hours,   Status:  Discontinued         07/22/25 1731 07/22/25 1731  Pharmacy to dose vancomycin  Continuous PRN,   Status:  Discontinued         07/22/25 1731 07/22/25 1700  lactated ringers bolus 1,000 mL  Once,   Status:  Discontinued         07/22/25 1612 07/22/25 1700  lactated ringers infusion  Continuous         07/22/25 1612 07/22/25 1700  mineral oil liquid 30 mL  Once,   Status:  Discontinued         07/22/25 1612 07/22/25 1700  penicillin G potassium 5 Million Units in sodium chloride 0.9 % 100 mL MBP  Once,   Status:  Discontinued        Placed in \"Followed by\" Linked Group    07/22/25 1612 07/22/25 1700  magnesium sulfate bolus from bag 0.04 g/mL solution 4 g  Once         07/22/25 1612    07/22/25 1700  magnesium sulfate 20 GM/500ML infusion  Continuous         07/22/25 1612    07/22/25 1612  Vital Signs - During Magnesium Infusion  Per Order Details,   Status:  Canceled         07/22/25 1612 07/22/25 1612  Continuous Pulse Oximetry  Continuous,   Status:  Canceled         07/22/25 1612    07/22/25 1612  Check Clonus (DTRs) With Vitals  Per Order Details,   Status:  Canceled         07/22/25 1612 07/22/25 1612  Strict Intake & Output  Every Shift,   Status:  Canceled       07/22/25 1612    07/22/25 1612  Total IV Fluids Should Equal 125 mL/hr  Continuous,   Status:  Canceled         07/22/25 1612    07/22/25 1611  calcium gluconate injection 1 g  As Needed         07/22/25 " 16125 1611  Assess LOC With Vitals  As Needed,   Status:  Canceled       25 1612    25 1610  Admit To Obstetrics Inpatient  Once         25 16125 1610  Code Status and Medical Interventions: CPR (Attempt to Resuscitate); Full Support  Continuous,   Status:  Canceled         25 1612    25 1610  Obtain informed consent  Once,   Status:  Canceled         25 1612    25 1610  Vital Signs Per hospital policy  Per Hospital Policy/Protocol,   Status:  Canceled         25 1612    25 1610  Continuous Fetal Monitoring With NST on Admission and Prior to Initiation of Oxytocin.  Per Order Details,   Status:  Canceled        Comments: Continuous Fetal Monitoring With NST on Admission & Prior to Initiation of Oxytocin.    25 16125 1610  External Uterine Contraction Monitoring  Per Hospital Policy/Protocol,   Status:  Canceled         25 1610  Notify Physician (specified)  Until Discontinued,   Status:  Canceled         25 1612    25 1610  Notify physician for tachysystole (per hospital algorithm)  Until Discontinued,   Status:  Canceled         25 1610  Notify physician if membranes ruptured, bleeding greater than 1 pad an hour, fetal heart tone abnormality, and severe pain  Until Discontinued,   Status:  Canceled         25 1612    25 1610  Up Ad Maira Luz  Until Discontinued,   Status:  Canceled         25 1610  Initiate Group Beta Strep (GBS) Prophylaxis Protocol, If Criteria Met  Continuous,   Status:  Canceled        Comments: NO TREATMENT RECOMMENDED IF: 1)  Maternal GBS status known negative 2)  Scheduled  birth with intact membranes, not in labor.  3 ) Maternal GBS unknown, no risk factors.   TREAT WITH ANTIBIOTICS IF:  1)  Maternal GBS status is known postive.  2)  Maternal GBS status unknown with these risk factors:  a)  Previous infant  "affected by GBS infection.  b)  GBS urinary tract infection (UTI) or bacteruria during pregnancy  c)  Unexplained maternal fever in labor (greater than or equal to 100.4F or 38.0C)  d)  Prolonged rupture of the membranes greater than or equal to 18 hours.  e)  Gestational age less than 37 weeks.    07/22/25 1612 07/22/25 1610  Assess Need for Indwelling Urinary Catheter - Follow Removal Protocol  Continuous,   Status:  Canceled        Comments: Indwelling Urinary Catheter Removal Criteria  Discontinue Indwelling Urinary Catheter Unless One of the Following is Present:  Urinary Retention or Obstruction  Chronic Urinary Catheter Use  End of Life  Critical Illness with Strict I/O   Tract or Abdominal Surgery  Stage 3/4 Sacral / Perineal Wound  Required Activity Restriction: Trauma  Required Activity Restriction: Spine Surgery  If Patient is Being Followed by Urology Contact Them PRIOR to Removal  Do Not Remove Indwelling Urinary Catheter Order is Present with a CLINICAL REASON to Maintain the Catheter. Provider is Required to Include a Clinical Reason to Maintain a Urinary Catheter    Patient Admitted With Indwelling Urinary Catheter (Not Placed at [facility])  Assess for Continued Need & Document Medical Necessity  If Infection is Suspected, Contact the Provider       Placed in \"And\" Linked Group    07/22/25 1612 07/22/25 1610  Urinary Catheter Care  Every Shift,   Status:  Canceled      Placed in \"And\" Linked Group    07/22/25 1612 07/22/25 1610  Gold Bulb Cervical Ripening w/o infusion  Once,   Status:  Canceled        Comments: Have Laborist Place Cervical Gold Without Infusion.    Once FB is placed: Start Low Dose Pitocin Drip Overnight, Then Increase Per Protocol at 0500 Next Morning    07/22/25 1612 07/22/25 1610  NPO Diet NPO Type: Ice Chips  Diet Effective Now,   Status:  Canceled         07/22/25 1612    07/22/25 1610  Insert Peripheral IV  Once,   Status:  Canceled         07/22/25 1612 " "   07/22/25 1610  Saline Lock & Maintain IV Access  Continuous,   Status:  Canceled         07/22/25 1612    07/22/25 1610  Place Sequential Compression Device  Once         07/22/25 1612 07/22/25 1610  Maintain Sequential Compression Device  Continuous         07/22/25 1612 07/22/25 1609  Position change  As Needed,   Status:  Canceled      Comments: For intra-uterine resuscitation for hypertonus, hypertstimulation, or non-reassuring fetal status    07/22/25 1612 07/22/25 1609  Insert Indwelling Urinary Catheter  As Needed,   Status:  Canceled        Comments: EPIDURAL PLACEMENT   Placed in \"And\" Linked Group    07/22/25 1612 07/22/25 1609  sodium chloride 0.9 % flush 10 mL  As Needed,   Status:  Discontinued         07/22/25 1612 07/22/25 1609  sodium chloride 0.9 % infusion 40 mL  As Needed,   Status:  Discontinued         07/22/25 1612 07/22/25 1609  lidocaine PF 1% (XYLOCAINE) injection 0.5 mL  Once As Needed,   Status:  Discontinued         07/22/25 1612 07/22/25 1609  acetaminophen (TYLENOL) tablet 650 mg  Every 4 Hours PRN,   Status:  Discontinued         07/22/25 1612 07/22/25 1609  butorphanol (STADOL) injection 1 mg  Every 2 Hours PRN,   Status:  Discontinued         07/22/25 1612 07/22/25 1609  butorphanol (STADOL) injection 2 mg  Every 2 Hours PRN,   Status:  Discontinued         07/22/25 1612 07/22/25 1609  ondansetron ODT (ZOFRAN-ODT) disintegrating tablet 4 mg  Every 6 Hours PRN,   Status:  Discontinued        Placed in \"Or\" Linked Group    07/22/25 1612 07/22/25 1609  ondansetron (ZOFRAN) injection 4 mg  Every 6 Hours PRN,   Status:  Discontinued        Placed in \"Or\" Linked Group    07/22/25 1612 07/22/25 1609  promethazine (PHENERGAN) suppository 12.5 mg  Every 6 Hours PRN,   Status:  Discontinued        Placed in \"Or\" Linked Group    07/22/25 1612 07/22/25 1609  promethazine (PHENERGAN) tablet 12.5 mg  Every 6 Hours PRN,   Status:  Discontinued        Placed " "in \"Or\" Linked Group    25 1612    25 1609  terbutaline (BRETHINE) injection 0.25 mg  As Needed,   Status:  Discontinued         25 1612    25 1536  CBC (No Diff)  STAT         25 1536    25 1536  Treponema pallidum AB w/Reflex RPR  STAT         25 1536    25 1535  Preeclampsia Panel  STAT         25 1536    25 1535  Type & Screen  STAT         25 1536    Unscheduled  Apply Ice to Perineum  As Needed      Comments: For 20 min q 2 hrs    25 0637    Unscheduled  Waffle Cushion  As Needed      Comments: For perineal discomfort    25 0637    Unscheduled  Donut Ring  As Needed      Comments: For perineal pain    25 0637    Unscheduled  Kpad  As Needed      Comments: For pain    25 0637    Unscheduled  Warm compress  As Needed       25 0637    Unscheduled  Apply ace wrap, tight bra, or binder  As Needed       25 0637    Unscheduled  Apply ice packs  As Needed       25 0637                     Operative/Procedure Notes (all)        Bob Ortiz MD at 25  Version 1 of 1         30 y.o.  OB History          5    Para   3    Term   2       1    AB   1    Living   3         SAB   1    IAB        Ectopic        Molar        Multiple        Live Births   1             Presents at 35 2/7 weeks as an induction of labour due to Severe pre-eclampsia   Her primary OB requests a Gold Bulb placement to initiate the induction of labour.    Fetal Heart Rate Assessment   Method: Fetal HR Assessment Method: external   Beats/min: Fetal HR (beats/min): 125   Baseline: Fetal HR Baseline: normal range   Variability: Fetal HR Variability: moderate (amplitude range 6 to 25 bpm)   Accels: Fetal HR Accelerations: greater than/equal to 15 bpm, lasting at least 15 seconds   Decels: Fetal HR Decelerations: absent   Tracing Category:       TOCO:  Irregular   SVE:  60/-2    A Gold Bulb was placed without " difficulties with 60 mL of sterile saline.  The patient tolerated the procedure well.    Electronically signed by Bob Ortiz MD at 25       Michelle Aden CNM at 25 0424  Version 1 of 1          [facility]   Vaginal Delivery Note    Patient Name: Felecia Estrella  : 1995  MRN: [id number]    Date of Delivery: 2025    Diagnosis     Pre & Post-Delivery:  Intrauterine pregnancy at 35w3d  Labor status: Induced Onset of Labor    Preeclampsia, severe    Diet controlled gestational diabetes mellitus (GDM) in third trimester     (normal spontaneous vaginal delivery)             Problem List    Transfer to Postpartum     Review the Delivery Report for details.     Delivery     Delivery: Vaginal, Spontaneous    YOB: 2027   Time of Birth:  Gestational Age 4:07 AM  35w3d     Anesthesia: Epidural    Delivering clinician: Michelle Aden   Forceps?   No   Vacuum? No    Shoulder dystocia present: No        Delivery narrative:  Called as baby was on perineum. Upon my arrival baby came out with effort of one push and she delivered a viable female infant over an intact perineum. Anterior shoulder delivered with ease and occult cord seen. Infant vigorous at delivery so placed on maternal abdomen. Delayed cord clamping x 1 min. Cord doubly clamped and cut. Cord blood and cord segment obtained. Placenta delivered spontaneously and appeared intact. Pitocin to IVF. Magnesium increased to 2 gm/hr.        Infant     Findings: female infant     Infant observations: Weight: 2430 g (5 lb 5.7 oz)  Length: 18 in  Observations/Comments:        Apgars: 7  @ 1 minute /    9  @ 5 minutes   Infant Name: Anami     Placenta & Cord         Placenta delivered  Spontaneous at   2025  4:12 AM    Cord: 3 vessels present.   Nuchal Cord?  no but occult cord noted as shoulder delivered   Cord blood obtained: Yes   Cord gases obtained:  No   Cord gas results: Venous:  No results found for:  "\"PHCVEN\", \"BECVEN\"    Arterial:  No results found for: \"PHCART\", \"BECART\"     Repair     Episiotomy: None    No    Lacerations: No   Estimated Blood Loss:       Quantitative Blood Loss:    QBL from VAG DEL: -100 (07/23/25 0415)   TOTAL BLOOD LOSS : -100 mL (7/22/2025  3:22 PM - 7/23/2025  4:49 AM)  Complications     hypertension    Disposition     Mother to Mother Baby/Postpartum but after going to APU for magnesium in stable condition currently.  Baby to NBN  in stable condition currently.    Michelle Aden CNM  07/23/25  04:49 EDT          Electronically signed by Michelle Aden CNM at 07/23/25 [id number]       Physician Progress Notes (all)    No notes of this type exist for this encounter.       "

## 2025-07-23 NOTE — L&D DELIVERY NOTE
" [facility]   Vaginal Delivery Note    Patient Name: Felecia Estrella  : 1995  MRN: [id number]    Date of Delivery: 2025    Diagnosis     Pre & Post-Delivery:  Intrauterine pregnancy at 35w3d  Labor status: Induced Onset of Labor    Preeclampsia, severe    Diet controlled gestational diabetes mellitus (GDM) in third trimester     (normal spontaneous vaginal delivery)             Problem List    Transfer to Postpartum     Review the Delivery Report for details.     Delivery     Delivery: Vaginal, Spontaneous    YOB: 2027   Time of Birth:  Gestational Age 4:07 AM  35w3d     Anesthesia: Epidural    Delivering clinician: Michelle Aden   Forceps?   No   Vacuum? No    Shoulder dystocia present: No        Delivery narrative:  Called as baby was on perineum. Upon my arrival baby came out with effort of one push and she delivered a viable female infant over an intact perineum. Anterior shoulder delivered with ease and occult cord seen. Infant vigorous at delivery so placed on maternal abdomen. Delayed cord clamping x 1 min. Cord doubly clamped and cut. Cord blood and cord segment obtained. Placenta delivered spontaneously and appeared intact. Pitocin to IVF. Magnesium increased to 2 gm/hr.        Infant     Findings: female infant     Infant observations: Weight: 2430 g (5 lb 5.7 oz)  Length: 18 in  Observations/Comments:        Apgars: 7  @ 1 minute /    9  @ 5 minutes   Infant Name: Anami     Placenta & Cord         Placenta delivered  Spontaneous at   2025  4:12 AM    Cord: 3 vessels present.   Nuchal Cord?  no but occult cord noted as shoulder delivered   Cord blood obtained: Yes   Cord gases obtained:  No   Cord gas results: Venous:  No results found for: \"PHCVEN\", \"BECVEN\"    Arterial:  No results found for: \"PHCART\", \"BECART\"     Repair     Episiotomy: None    No    Lacerations: No   Estimated Blood Loss:       Quantitative Blood Loss:    QBL from VAG DEL: -100 " (07/23/25 0415)   TOTAL BLOOD LOSS : -100 mL (7/22/2025  3:22 PM - 7/23/2025  4:49 AM)  Complications     hypertension    Disposition     Mother to Mother Baby/Postpartum but after going to [facility]U for magnesium in stable condition currently.  Baby to NBN  in stable condition currently.    Michelle Aden CNM  07/23/25  04:49 EDT

## 2025-07-23 NOTE — ANESTHESIA PREPROCEDURE EVALUATION
Anesthesia Evaluation     Patient summary reviewed and Nursing notes reviewed                Airway   Mallampati: II  TM distance: >3 FB  Neck ROM: full  No difficulty expected  Dental - normal exam     Pulmonary - negative pulmonary ROS   Cardiovascular     (+) hypertension      Neuro/Psych  (+) psychiatric history Anxiety  GI/Hepatic/Renal/Endo    (+) renal disease-, diabetes mellitus gestational    Musculoskeletal (-) negative ROS    Abdominal    Substance History - negative use     OB/GYN    (+) Pregnant, Preeclampsia, pregnancy induced hypertension        Other - negative ROS                   Anesthesia Plan    ASA 2     epidural       Anesthetic plan, risks, benefits, and alternatives have been provided, discussed and informed consent has been obtained with: patient.    CODE STATUS:    Code Status (Patient has no pulse and is not breathing): CPR (Attempt to Resuscitate)  Medical Interventions (Patient has pulse or is breathing): Full Support  Level Of Support Discussed With: Patient

## 2025-07-23 NOTE — H&P
"[facility]  Obstetric History and Physical    Chief Complaint   Patient presents with    Elevated Blood Pressure       Subjective     Patient is a 30 y.o. female  currently at 35w2d, who presents from office with severe range BP's.  They were 159/100 and 160/104 with headache and 2+ protein.  States she has had BP's at home as high as 180/125 but \"took and extra BP pill when they got that high\".  D/W Dr. Vargas and decision made to admit and proceed with delivery and Magnesium Sulfate.. On intake denies  contractions, denies  leakage of fluid, denies  vaginal bleeding. reports fetal movement.    Her prenatal care is complicated by  hypertension  chronic hypertension with superimposed pre-eclampsia and gestational diabetes dx recently as she did not do her test until 34 weeks because she \"forgot\". She was initially on 100 mg Labetalol twice a day and this was increased to 200 mg twice a day. Her previous obstetric/gynecological history is noted for is remarkable for early deliveries x 3 due to blood pressure issues. She ended up on BP meds after last delivery and has not been able to be off of them.      The following portions of the patients history were reviewed and updated as appropriate: current medications, allergies, past medical history, past surgical history, past family history, past social history, and problem list .       Prenatal Information:  Prenatal Results       Initial Prenatal Labs       Test Value Reference Range Date Time    Hemoglobin  10.9 g/dL 12.0 - 15.9 25 030       11.2 g/dL 12.0 - 15.9 25 0002       12.6 g/dL 12.0 - 15.9 25 1537      ^ 13.4 g/dL 11.2 - 15.7 25 0139    Hematocrit  31.7 % 34.0 - 46.6 25 0304       33.1 % 34.0 - 46.6 25 0002       37.7 % 34.0 - 46.6 25 1537      ^ 38.9 % 34.0 - 45.0 25 0139    Platelets  238 10*3/mm3 140 - 450 25 0304       237 10*3/mm3 140 - 450 25 0002       307 10*3/mm3 140 - 450 25 " 1537      ^ 317 10*3/uL 155 - 369 01/14/25 0139    Rubella IgG  6.06 index Immune >0.99 01/30/25 1537    Hepatitis B SAg  Non-Reactive  Non-Reactive 01/30/25 1537    Hepatitis C Ab  Non-Reactive  Non-Reactive 01/30/25 1537    RPR        T. Pallidum Ab   Non-Reactive  Non-Reactive 06/20/25 1158       Non-Reactive  Non-Reactive 01/30/25 1537    ABO  O   07/22/25 1631    Rh  Positive   07/22/25 1631    Antibody Screen  Negative   04/14/25 1031       Negative   01/30/25 1537    HIV  Non-Reactive  Non-Reactive 01/30/25 1537    Urine Culture  <10,000 CFU/mL Normal Urogenital Ev   06/20/25 1050    Gonorrhea        Chlamydia        TSH        HgB A1c         Varicella IgG  Reactive  Non Reactive 01/30/25 1537    Hemoglobinopathy Fractionation        Hemoglobinopathy (genetic testing)        Cystic fibrosis         Spinal muscular atrophy        Fragile X                  Fetal testing        Test Value Reference Range Date Time    NIPT        MSAFP        AFP-4                  2nd and 3rd Trimester       Test Value Reference Range Date Time    Hemoglobin (repeated)  10.9 g/dL 12.0 - 15.9 07/22/25 1631       10.3 g/dL 12.0 - 15.9 07/10/25 0044       11.9 g/dL 12.0 - 15.9 06/20/25 1158    Hematocrit (repeated)  32.9 % 34.0 - 46.6 07/22/25 1631       31.3 % 34.0 - 46.6 07/10/25 0044       36.2 % 34.0 - 46.6 06/20/25 1158    Platelets   224 10*3/mm3 140 - 450 07/22/25 1631       213 10*3/mm3 140 - 450 07/10/25 0044       238 10*3/mm3 140 - 450 06/20/25 1158       238 10*3/mm3 140 - 450 04/14/25 0304       237 10*3/mm3 140 - 450 04/12/25 0002       307 10*3/mm3 140 - 450 01/30/25 1537      ^ 317 10*3/uL 155 - 369 01/14/25 0139    1 hour GTT   186 mg/dL 74 - 180 07/12/25 0808       157 mg/dL 65 - 139 06/20/25 1158    Antibody Screen (repeated)  Negative   07/22/25 1631       Negative   06/20/25 1158    3rd TM syphilis scrn (repeated)  RPR         3rd TM syphilis scrn (repeated) TP-Ab  Non-Reactive  Non-Reactive 06/20/25 1158     3rd TM syphilis screen TB-Ab (FTA)  Non-Reactive  Non-Reactive 25 1158    Syphilis cascade test TP-Ab (EIA)        Syphilis cascade TPPA        GTT Fasting        GTT 1 Hr        GTT 2 Hr        GTT 3 Hr  126 mg/dL 74 - 140 25 0808    Group B Strep                  Other testing        Test Value Reference Range Date Time    Parvo IgG         CMV IgG                   Drug Screening       Test Value Reference Range Date Time    Amphetamine Screen        Barbiturate Screen        Benzodiazepine Screen        Methadone Screen        Phencyclidine Screen        Opiates Screen        THC Screen        Cocaine Screen        Propoxyphene Screen        Buprenorphine Screen        Methamphetamine Screen        Oxycodone Screen        Tricyclic Antidepressants Screen                  Legend    ^: Historical                               Past OB History:       OB History    Para Term  AB Living   5 3 2 1 1 3   SAB IAB Ectopic Molar Multiple Live Births   1 0 0 0 0 1      # Outcome Date GA Lbr Noé/2nd Weight Sex Type Anes PTL Lv   5 Current            4 Term 18 37w0d   F Vag-Spont      3 Term 16 38w0d   M Vag-Spont      2 SAB            1  09 32w0d   F Vag-Spont  Y [place]       Past Medical History: Past Medical History:   Diagnosis Date    Anxiety     Chronic hypertension     Kidney infection     Kidney stone       Past Surgical History Past Surgical History:   Procedure Laterality Date    D & C WITH SUCTION        Family History: Family History   Problem Relation Age of Onset    Hypertension Mother     Stroke Mother     Diabetes Maternal Grandmother       Social History:  reports that she has quit smoking. Her smoking use included cigarettes. She has a 1.5 pack-year smoking history. She has never used smokeless tobacco.   reports that she does not currently use alcohol.   reports no history of drug use.        REVIEW OF SYSTEMS              Reports fetal movement is  normal             Denies leakage of amniotic fluid.             Denies vaginal bleeding             She reports No contractions             All other systems reviewed and are negative    Objective       Vital Signs Range for the last 24 hours  Temperature: Temp:  [98.1 °F (36.7 °C)-98.6 °F (37 °C)] 98.6 °F (37 °C)   Temp Source: Temp src: Axillary   BP: BP: (0-164)/(0-101) 153/101   Pulse: Heart Rate:  [] 89   Respirations: Resp:  [19-20] 19   SPO2: SpO2:  [99 %] 99 %   O2 Amount (l/min):     O2 Devices     Weight: Weight:  [99.3 kg (219 lb)] 99.3 kg (219 lb)       Presentation: vertex   Cervix: Exam by: Method: sterile vaginal exam performed   Dilation: Cervical Dilation (cm): 2-3   Effacement: Cervical Effacement: 70   Station: Fetal Station: -2        Fetal Heart Rate Assessment   Method: Fetal HR Assessment Method: external   Beats/min: Fetal HR (beats/min): 125   Baseline: Fetal HR Baseline: normal range   Varibility: Fetal HR Variability: moderate (amplitude range 6 to 25 bpm)   Accels: Fetal HR Accelerations: greater than/equal to 15 bpm, lasting at least 15 seconds   Decels: Fetal HR Decelerations: absent   Tracing Category:  1     Uterine Assessment   Method: Method: external tocotransducer   Frequency (min): Contraction Frequency (Minutes): 2-3   Ctx Count in 10 min:     Duration:     Intensity:     Intensity by IUPC:     Resting Tone:     Resting Tone by IUPC:     Elmer Units:         Constitutional:  Well developed, well nourished, no acute distress, well-groomed.   Respiratory:  Lungs are clear to auscultation bilaterally, normal breath sounds.   Cardiovascular:  Normal rate and rhythm, no murmurs.   Gastrointestinal:  Soft, gravid, nontender.  Uterus: Soft, nontender. Fundus appropriate for dates.  Neurologic:  Alert & oriented x 3,  no focal deficits noted.   Psychiatric:  Speech and behavior appropriate.   Extremities: no cyanosis, clubbing or edema, no evidence of DVT.        Labs:  Lab  Results   Component Value Date    WBC 11.06 (H) 07/22/2025    HGB 10.9 (L) 07/22/2025    HCT 32.9 (L) 07/22/2025    MCV 91.4 07/22/2025     07/22/2025    POCGLU 116 07/22/2025    CREATININE 0.93 07/22/2025    URICACID 5.3 07/22/2025    AST 25 07/22/2025    ALT 9 07/22/2025     (H) 07/22/2025     Results from last 7 days   Lab Units 07/22/25  1631   ABO TYPING  O   RH TYPING  Positive   ANTIBODY SCREEN  Negative           Assessment & Plan       Preeclampsia, severe  Balloon was placed on arrival along with PEP labs that were normal.  She was also started on magnesium 4 gm bolus and then 2 gm/ hr on arrival.  She has received on dose of IV Labetalol and just recently got Stadol.  Was feeling a lot of contractions and SVE now changed from FT on admission to 2-3 with balloon in.  Still having a headache.      Assessment:   IUP at 35w2d weeks gestation with reactive fetal status.    2.   induction of labor  for severe preeclampsia  with unfavorable cervix  3.   Obstetrical history significant for is remarkable for gestational hypertension in prior pregnancies, and was unable to get off BP medication after last delivery  4.   GBS status: unknown so antibiotics started on admission  5.   Rh: positive    Plan:  Oxytocin induction, Oxytocin augmentation, Gold bulb for cervical ripening, Magnesium sulfate seizure prophylaxis, and Antibiotics for GBS prophylaxis  Continuous FHT and TOCO monitoring.   Diet: NPO  Desires epidural for anesthesia.   Plan of care has been reviewed with patient and questions answered.  Risks, benefits of treatment plan have been discussed.   Consent obtained to proceed with labor management and subsequent delivery of baby.        Michelle Aden CNM  7/22/2025  20:17 EDT

## 2025-07-24 LAB
BASOPHILS # BLD AUTO: 0.04 10*3/MM3 (ref 0–0.2)
BASOPHILS NFR BLD AUTO: 0.4 % (ref 0–1.5)
DEPRECATED RDW RBC AUTO: 46.5 FL (ref 37–54)
EOSINOPHIL # BLD AUTO: 0.17 10*3/MM3 (ref 0–0.4)
EOSINOPHIL NFR BLD AUTO: 1.7 % (ref 0.3–6.2)
ERYTHROCYTE [DISTWIDTH] IN BLOOD BY AUTOMATED COUNT: 13.5 % (ref 12.3–15.4)
HCT VFR BLD AUTO: 26.9 % (ref 34–46.6)
HGB BLD-MCNC: 8.9 G/DL (ref 12–15.9)
IMM GRANULOCYTES # BLD AUTO: 0.04 10*3/MM3 (ref 0–0.05)
IMM GRANULOCYTES NFR BLD AUTO: 0.4 % (ref 0–0.5)
LYMPHOCYTES # BLD AUTO: 2.13 10*3/MM3 (ref 0.7–3.1)
LYMPHOCYTES NFR BLD AUTO: 21.1 % (ref 19.6–45.3)
MCH RBC QN AUTO: 31.2 PG (ref 26.6–33)
MCHC RBC AUTO-ENTMCNC: 33.1 G/DL (ref 31.5–35.7)
MCV RBC AUTO: 94.4 FL (ref 79–97)
MONOCYTES # BLD AUTO: 0.75 10*3/MM3 (ref 0.1–0.9)
MONOCYTES NFR BLD AUTO: 7.4 % (ref 5–12)
NEUTROPHILS NFR BLD AUTO: 6.98 10*3/MM3 (ref 1.7–7)
NEUTROPHILS NFR BLD AUTO: 69 % (ref 42.7–76)
NRBC BLD AUTO-RTO: 0 /100 WBC (ref 0–0.2)
PLATELET # BLD AUTO: 217 10*3/MM3 (ref 140–450)
PMV BLD AUTO: 11.8 FL (ref 6–12)
RBC # BLD AUTO: 2.85 10*6/MM3 (ref 3.77–5.28)
WBC NRBC COR # BLD AUTO: 10.11 10*3/MM3 (ref 3.4–10.8)

## 2025-07-24 PROCEDURE — 85025 COMPLETE CBC W/AUTO DIFF WBC: CPT | Performed by: ADVANCED PRACTICE MIDWIFE

## 2025-07-24 RX ORDER — NIFEDIPINE 30 MG/1
30 TABLET, EXTENDED RELEASE ORAL DAILY
Status: DISCONTINUED | OUTPATIENT
Start: 2025-07-24 | End: 2025-07-25 | Stop reason: HOSPADM

## 2025-07-24 RX ORDER — NIFEDIPINE 10 MG/1
10 CAPSULE ORAL ONCE
Status: COMPLETED | OUTPATIENT
Start: 2025-07-24 | End: 2025-07-24

## 2025-07-24 RX ADMIN — NIFEDIPINE 30 MG: 30 TABLET, EXTENDED RELEASE ORAL at 16:45

## 2025-07-24 RX ADMIN — DOCUSATE SODIUM 100 MG: 100 CAPSULE, LIQUID FILLED ORAL at 08:48

## 2025-07-24 RX ADMIN — PRENATAL VITAMINS-IRON FUMARATE 27 MG IRON-FOLIC ACID 0.8 MG TABLET 1 TABLET: at 08:48

## 2025-07-24 RX ADMIN — LABETALOL HYDROCHLORIDE 200 MG: 200 TABLET, FILM COATED ORAL at 20:32

## 2025-07-24 RX ADMIN — LABETALOL HYDROCHLORIDE 200 MG: 200 TABLET, FILM COATED ORAL at 08:48

## 2025-07-24 RX ADMIN — IBUPROFEN 600 MG: 600 TABLET, FILM COATED ORAL at 08:48

## 2025-07-24 RX ADMIN — IBUPROFEN 600 MG: 600 TABLET, FILM COATED ORAL at 16:05

## 2025-07-24 RX ADMIN — NIFEDIPINE 10 MG: 10 CAPSULE ORAL at 16:45

## 2025-07-24 RX ADMIN — DOCUSATE SODIUM 100 MG: 100 CAPSULE, LIQUID FILLED ORAL at 20:32

## 2025-07-24 RX ADMIN — HYDROCODONE BITARTRATE AND ACETAMINOPHEN 1 TABLET: 10; 325 TABLET ORAL at 05:38

## 2025-07-24 RX ADMIN — BUPROPION HYDROCHLORIDE 150 MG: 150 TABLET, EXTENDED RELEASE ORAL at 08:48

## 2025-07-24 RX ADMIN — WITCH HAZEL: 500 SOLUTION RECTAL; TOPICAL at 20:20

## 2025-07-24 RX ADMIN — HYDROCODONE BITARTRATE AND ACETAMINOPHEN 1 TABLET: 10; 325 TABLET ORAL at 16:05

## 2025-07-24 RX ADMIN — HYDROCODONE BITARTRATE AND ACETAMINOPHEN 1 TABLET: 10; 325 TABLET ORAL at 10:24

## 2025-07-24 NOTE — PROGRESS NOTES
7/24/2025  PPD #1    Subjective   Felecia feels well.  Patient describes her lochia as less than menses.  Pain is well controlled  BP stable       Objective   Temp: Temp:  [97 °F (36.1 °C)-98.6 °F (37 °C)] 98.3 °F (36.8 °C) Temp src: Oral   BP: BP: (118-173)/(67-99) 140/94        Pulse: Heart Rate:  [78-92] 82  RR: Resp:  [14-18] 16    General:  No acute distress   Abdomen: Fundus firm and beneath umbilicus   Pelvis: deferred     Lab Results   Component Value Date    WBC 11.06 (H) 07/22/2025    HGB 10.9 (L) 07/22/2025    HCT 32.9 (L) 07/22/2025    MCV 91.4 07/22/2025     07/22/2025    HEPBSAG Non-Reactive 01/30/2025    AST 25 07/23/2025    ALT 10 07/23/2025    URICACID 5.0 07/23/2025       Assessment  PPD# 1 after vaginal delivery  2. Chronic HTN with superimposed pre-e with severe features      Plan  Supportive care, anticipate discharge in am.  Continue Lab 200 BID        This note has been electronically signed.    Coni Mishra, BLAINE  08:55 EDT  July 24, 2025

## 2025-07-24 NOTE — LACTATION NOTE
07/24/25 1428   Maternal Information   Date of Referral 07/24/25   Person Making Referral nurse  (PCN asked me to help with lactation as pt is discouraged on her milk volumes & unable to get infant latched.)   Maternal Assessment   Breast Shape Bilateral:;round   Breast Density Bilateral:;soft   Nipples Bilateral:;everted  (Patient was sized for 30mm Medela flanges. Pt also has a supernumerary nipple on the L breast on the bottom side. Mother states it does leak & she has pumped it in the past.)   Left Nipple Symptoms intact;nontender   Right Nipple Symptoms intact;nontender   Maternal Infant Feeding   Maternal Emotional State anxious;receptive  (very appreciative for any breastfeeding help.)   Infant Positioning   (Infant was able to latch in L & R CC positions.)   Signs of Milk Transfer deep jaw excursions noted   Pain with Feeding no   Comfort Measures Before/During Feeding suction broken using finger;maternal position adjusted;latch adjusted;infant position adjusted   Latch Assistance full assistance needed  (Mother took over feeding after demonstration given.)   Support Person Involvement actively supporting mother  (FOB at bedside & very attentive to patient & daughter)   Milk Expression/Equipment   Breast Pump Type double electric, hospital grade;double electric, personal;manual pump  (Pt enc'd to pump q3hrs until milk is in since she is supp w/donor milk. Pt decided to go back to the [facility] 22cal for supp but still agrees to pump d/t gestation.)   Breast Pumping   Breast Pumping Interventions post-feed pumping encouraged   Lactation Referrals   Lactation Referrals   (Highly recommended pt follow up with outpatient lactation services if she needs help after discharge)

## 2025-07-25 VITALS
DIASTOLIC BLOOD PRESSURE: 74 MMHG | TEMPERATURE: 98.2 F | HEIGHT: 67 IN | OXYGEN SATURATION: 97 % | BODY MASS INDEX: 34.37 KG/M2 | RESPIRATION RATE: 16 BRPM | WEIGHT: 219 LBS | HEART RATE: 80 BPM | SYSTOLIC BLOOD PRESSURE: 130 MMHG

## 2025-07-25 RX ORDER — NIFEDIPINE 30 MG
30 TABLET, EXTENDED RELEASE ORAL DAILY
Qty: 30 TABLET | Refills: 1 | Status: SHIPPED | OUTPATIENT
Start: 2025-07-26

## 2025-07-25 RX ORDER — FAMOTIDINE 20 MG/1
20 TABLET, FILM COATED ORAL 2 TIMES DAILY
Status: DISCONTINUED | OUTPATIENT
Start: 2025-07-25 | End: 2025-07-25 | Stop reason: HOSPADM

## 2025-07-25 RX ORDER — PSEUDOEPHEDRINE HCL 30 MG
100 TABLET ORAL 2 TIMES DAILY PRN
Qty: 60 CAPSULE | Refills: 1 | Status: SHIPPED | OUTPATIENT
Start: 2025-07-25

## 2025-07-25 RX ORDER — FERROUS SULFATE 325(65) MG
325 TABLET ORAL
Qty: 30 TABLET | Refills: 0 | Status: SHIPPED | OUTPATIENT
Start: 2025-07-25

## 2025-07-25 RX ORDER — LABETALOL 200 MG/1
200 TABLET, FILM COATED ORAL 2 TIMES DAILY
Qty: 60 TABLET | Refills: 1 | Status: SHIPPED | OUTPATIENT
Start: 2025-07-25

## 2025-07-25 RX ORDER — IBUPROFEN 600 MG/1
600 TABLET, FILM COATED ORAL EVERY 6 HOURS PRN
Qty: 60 TABLET | Refills: 1 | Status: SHIPPED | OUTPATIENT
Start: 2025-07-25

## 2025-07-25 RX ADMIN — LABETALOL HYDROCHLORIDE 200 MG: 200 TABLET, FILM COATED ORAL at 08:28

## 2025-07-25 RX ADMIN — FAMOTIDINE 20 MG: 20 TABLET, FILM COATED ORAL at 12:40

## 2025-07-25 RX ADMIN — IBUPROFEN 600 MG: 600 TABLET, FILM COATED ORAL at 14:37

## 2025-07-25 RX ADMIN — HYDROCODONE BITARTRATE AND ACETAMINOPHEN 1 TABLET: 5; 325 TABLET ORAL at 08:28

## 2025-07-25 RX ADMIN — PRENATAL VITAMINS-IRON FUMARATE 27 MG IRON-FOLIC ACID 0.8 MG TABLET 1 TABLET: at 10:26

## 2025-07-25 RX ADMIN — ACETAMINOPHEN 650 MG: 325 TABLET ORAL at 04:25

## 2025-07-25 RX ADMIN — NIFEDIPINE 30 MG: 30 TABLET, EXTENDED RELEASE ORAL at 08:28

## 2025-07-25 RX ADMIN — IBUPROFEN 600 MG: 600 TABLET, FILM COATED ORAL at 03:30

## 2025-07-25 RX ADMIN — DOCUSATE SODIUM 100 MG: 100 CAPSULE, LIQUID FILLED ORAL at 10:26

## 2025-07-25 RX ADMIN — BUPROPION HYDROCHLORIDE 150 MG: 150 TABLET, EXTENDED RELEASE ORAL at 10:26

## 2025-07-25 RX ADMIN — Medication 1 APPLICATION: at 12:40

## 2025-07-25 NOTE — DISCHARGE SUMMARY
[facility]  Vaginal Delivery Discharge Summary      Patient: Felecia Estrella      MR#:[id number]  Admission  Diagnosis: IOL  Discharge Diagnosis:     Date of Admission: 2025  Date of Discharge:  2025    Procedures:  Vaginal, Spontaneous    2025   4:07 AM     Service:  Obstetrics    Hospital Course:  Patient underwent vaginal delivery and remained in the hospital for 3 days.  During that time she remained afebrile and hemodynamically stable. She is s/p Magnesium for Pre-e with severe features. BP stable on Lab 200 BID and Procardia 30 mg XL daily. On the day of discharge, she was eating, ambulating and voiding without difficulty.      Lab Results   Component Value Date    WBC 10.11 2025    HGB 8.9 (L) 2025    HCT 26.9 (L) 2025    MCV 94.4 2025     2025    POCGLU 94 2025    CREATININE 0.96 2025    URICACID 5.0 2025    AST 25 2025    ALT 10 2025     (H) 2025     Results from last 7 days   Lab Units 25  1631   ABO TYPING  O   RH TYPING  Positive   ANTIBODY SCREEN  Negative       Discharge Medications     Discharge Medications        New Medications        Instructions Start Date   docusate sodium 100 MG capsule   100 mg, Oral, 2 Times Daily PRN      ferrous sulfate 325 (65 FE) MG tablet   325 mg, Oral, Daily With Breakfast      ibuprofen 600 MG tablet  Commonly known as: ADVIL,MOTRIN   600 mg, Oral, Every 6 Hours PRN      NIFEdipine CC 30 MG 24 hr tablet  Commonly known as: ADALAT CC   30 mg, Oral, Daily   Start Date: 2025            Changes to Medications        Instructions Start Date   labetalol 200 MG tablet  Commonly known as: NORMODYNE  What changed:   medication strength  when to take this   200 mg, Oral, 2 Times Daily             Continue These Medications        Instructions Start Date   escitalopram 10 MG tablet  Commonly known as: Lexapro   10 mg, Oral, Daily      Prenatal 27-1 27-1 MG tablet  tablet   1 tablet, Daily             Stop These Medications      acetaminophen 500 MG tablet  Commonly known as: TYLENOL     nitrofurantoin (macrocrystal-monohydrate) 100 MG capsule  Commonly known as: Macrobid              Discharge Disposition:  To Home    Discharge Condition:  Stable    Discharge Diet: regular    Activity at Discharge: Pelvic rest    Follow-up Appointments    6 weeks      BLAINE Clements  07/25/25  08:51 EDT

## 2025-07-25 NOTE — DISCHARGE INSTRUCTIONS
Please check your blood pressure daily and record.  Call the doctors office Monday for blood pressure checks  the same day

## 2025-07-25 NOTE — LACTATION NOTE
25 1300   Maternal Information   Date of Referral 25   Person Making Referral lactation consultant   Maternal Reason for Referral breastfeeding currently   Infant Reason for Referral  infant   Maternal Assessment   Breast Size Issue none   Breast Shape Bilateral:;round   Breast Density Bilateral:;soft;filling   Nipples Bilateral:;everted   Left Nipple Symptoms intact;nontender   Right Nipple Symptoms intact;nontender   Maternal Infant Feeding   Maternal Emotional State receptive;relaxed;independent   Milk Expression/Equipment   Breast Pump Type double electric, hospital grade;manual pump   Breast Pump Flange Type hard   Breast Pump Flange Size 30 mm;36 mm  (tried 36, pt. states these feel better, tried 30 again without turning suction up too high and with Lanolin and pt. States this is much better. She got 25 ml.)   Breast Pumping   Breast Pumping Interventions early pumping promoted;frequent pumping encouraged;post-feed pumping encouraged;other (see comments)  (encouraged to pump q 3 hours after feeds to build optimal milk supply if that is what she would like to do)   Breast Pumping double electric breast pump utilized   Lactation Referrals   Lactation Referrals outpatient lactation program   Outpatient Lactation Program Lactation Follow-up Date/Time has apt. scheduled      Returned to help pt. With pumping. Applied Lanolin prior to pumping. Reviewed suction setting and increasing suction too high will make her really sore, but not get more milk out. Pt. V/u. Tried pumping with the 36 mm flange first, which she states feels better. After a couple minutes changed back to the 30 mm as I feel the 36 mm is too large. She states the 30 mm feel better now with the Lanolin and suction turned down. Took Dr. Brown's bottle as Mom states infant having difficulty with slow flow nipple. She got 25 ml breast milk and is very happy that pumping did not hurt like it did earlier today. Will f/u at  outpatient apt. 8/4.

## 2025-07-25 NOTE — LACTATION NOTE
25 1058   Maternal Information   Date of Referral 25   Person Making Referral other (see comments)  (Speech states pt. requests to schedule apt. with outpatient clinic)   Maternal Reason for Referral breastfeeding currently   Infant Reason for Referral  infant   Maternal Assessment   Breast Size Issue none   Breast Shape Bilateral:;round   Breast Density Bilateral:;soft   Nipples Bilateral:;everted;other (see comments)  (gave size #36 mm flange to try next time she pumps. She c/o pain with pumping. She feesl the flanges 30 mm are too tight.)   Left Nipple Symptoms intact;nontender   Right Nipple Symptoms intact;nontender   Maternal Infant Feeding   Maternal Emotional State receptive;independent   Milk Expression/Equipment   Breast Pump Type double electric, hospital grade;double electric, personal   Breast Pumping   Breast Pumping Interventions early pumping promoted;other (see comments);frequent pumping encouraged;post-feed pumping encouraged  (encouraged to pump q 3 hours after feeds to build optimal milk supply if that is what she would like to do with h/o low supply)   Lactation Referrals   Lactation Referrals outpatient lactation program   Outpatient Lactation Program Lactation Follow-up Date/Time prn after discharge     Gema with Speech requested LC to schedule pt. For outpatient clinic apt. Scheduled for  at 2:30 pm. I changed void diaper per Mom's ok while she goes to restroom. Mom states she was able to pump 30 ml this morning. She c/o pain with pumping and feels the flange size is too small. I sterilized and took her the size 36 mm flange to try at next pumping session. Also encouraged to use Lanolin when pumping. She v/u. She requested breast pads. I took those to pt. She states she has a supernumerary nipple on left breast that does leak at times. Encouraged to pump q 3 hours after feeds and/or when supplementing to build optimal milk supply if that is what she would  like to do. Reviewed supply and demand. Pt. V/u. She is currently supplementing with formula. Encouraged to call outpatient lactation clinic prn after discharge.

## 2025-07-28 ENCOUNTER — MATERNAL SCREENING (OUTPATIENT)
Dept: CALL CENTER | Facility: HOSPITAL | Age: 30
End: 2025-07-28
Payer: COMMERCIAL

## 2025-07-28 NOTE — OUTREACH NOTE
Maternal Screening Survey      Flowsheet Row Responses   Eligibility Eligible   Prep survey completed? Yes   Facility patient discharged from? [facility]

## 2025-08-04 ENCOUNTER — TELEPHONE (OUTPATIENT)
Dept: LACTATION | Facility: HOSPITAL | Age: 30
End: 2025-08-04
Payer: COMMERCIAL

## 2025-08-05 ENCOUNTER — MATERNAL SCREENING (OUTPATIENT)
Dept: CALL CENTER | Facility: HOSPITAL | Age: 30
End: 2025-08-05
Payer: COMMERCIAL